# Patient Record
Sex: MALE | Race: WHITE | HISPANIC OR LATINO | Employment: OTHER | URBAN - METROPOLITAN AREA
[De-identification: names, ages, dates, MRNs, and addresses within clinical notes are randomized per-mention and may not be internally consistent; named-entity substitution may affect disease eponyms.]

---

## 2019-11-15 ENCOUNTER — OFFICE VISIT (OUTPATIENT)
Dept: FAMILY MEDICINE | Facility: CLINIC | Age: 44
End: 2019-11-15
Payer: COMMERCIAL

## 2019-11-15 VITALS
OXYGEN SATURATION: 97 % | DIASTOLIC BLOOD PRESSURE: 95 MMHG | HEIGHT: 70 IN | SYSTOLIC BLOOD PRESSURE: 129 MMHG | HEART RATE: 76 BPM | WEIGHT: 206 LBS | TEMPERATURE: 97.8 F | BODY MASS INDEX: 29.49 KG/M2 | RESPIRATION RATE: 16 BRPM

## 2019-11-15 DIAGNOSIS — R73.09 ELEVATED GLUCOSE: ICD-10-CM

## 2019-11-15 DIAGNOSIS — Z13.220 SCREENING FOR LIPOID DISORDERS: ICD-10-CM

## 2019-11-15 DIAGNOSIS — Z23 NEED FOR TD VACCINE: ICD-10-CM

## 2019-11-15 DIAGNOSIS — Z00.00 PREVENTATIVE HEALTH CARE: Primary | ICD-10-CM

## 2019-11-15 LAB
BUN SERPL-MCNC: 14 MG/DL (ref 5–24)
CALCIUM SERPL-MCNC: 9.1 MG/DL (ref 8.5–10.4)
CHLORIDE SERPLBLD-SCNC: 106 MMOL/L (ref 94–109)
CHOLEST SERPL-MCNC: 217 MG/DL (ref 0–200)
CHOLEST/HDLC SERPL: 5.2 {RATIO} (ref 0–5)
CO2 SERPL-SCNC: 28 MMOL/L (ref 20–32)
CREAT SERPL-MCNC: 0.9 MG/DL (ref 0.8–1.5)
EGFR CALCULATED (BLACK REFERENCE): 119
EGFR CALCULATED (NON BLACK REFERENCE): 98.4
FASTING SPECIMEN: YES
GLUCOSE SERPL-MCNC: 109 MG/DL (ref 60–99)
HDLC SERPL-MCNC: 42 MG/DL
LDLC SERPL CALC-MCNC: 132 MG/DL (ref 0–129)
POTASSIUM SERPL-SCNC: 4.2 MMOL/L (ref 3.4–5.3)
SODIUM SERPL-SCNC: 141 MMOL/L (ref 137.3–146.3)
TRIGL SERPL-MCNC: 216 MG/DL (ref 0–150)
VLDL-CHOLESTEROL: 43 (ref 7–32)

## 2019-11-15 SDOH — HEALTH STABILITY: MENTAL HEALTH: HOW OFTEN DO YOU HAVE A DRINK CONTAINING ALCOHOL?: 2-3 TIMES A WEEK

## 2019-11-15 ASSESSMENT — MIFFLIN-ST. JEOR: SCORE: 1837.53

## 2019-11-15 NOTE — NURSING NOTE
"44 year old  Chief Complaint   Patient presents with     Physical     no concerns       Blood pressure (!) 131/96, pulse 71, temperature 97.8  F (36.6  C), temperature source Oral, resp. rate 16, height 1.773 m (5' 9.8\"), weight 93.4 kg (206 lb), SpO2 97 %. Body mass index is 29.72 kg/m .  Patient Active Problem List   Diagnosis     Preventative health care     Hydradenitis     Acute pain of right shoulder       Wt Readings from Last 2 Encounters:   11/15/19 93.4 kg (206 lb)   08/04/16 85.3 kg (188 lb 1.9 oz)     BP Readings from Last 3 Encounters:   11/15/19 (!) 131/96   08/04/16 123/87   07/01/16 (!) 136/98         Current Outpatient Medications   Medication     ibuprofen (ADVIL,MOTRIN) 200 MG tablet     NO ACTIVE MEDICATIONS     No current facility-administered medications for this visit.        Social History     Tobacco Use     Smoking status: Never Smoker     Smokeless tobacco: Never Used   Substance Use Topics     Alcohol use: Yes     Frequency: 2-3 times a week     Drug use: No       Health Maintenance Due   Topic Date Due     HIV SCREENING  09/03/1992     PREVENTIVE CARE VISIT  12/06/2013     LIPID  12/06/2017     PHQ-2  01/01/2019     DTAP/TDAP/TD IMMUNIZATION (2 - Td) 04/30/2019     INFLUENZA VACCINE (1) 09/01/2019       No results found for: PAP      November 15, 2019 8:04 AM    "

## 2019-11-15 NOTE — NURSING NOTE
Prior to immunization administration, verified patients identity using patient s name and date of birth. Please see Immunization Activity for additional information.     Screening Questionnaire for Adult Immunization    Are you sick today?   No   Do you have allergies to medications, food, a vaccine component or latex?   No   Have you ever had a serious reaction after receiving a vaccination?   No   Do you have a long-term health problem with heart disease, lung disease, asthma, kidney disease, metabolic disease (e.g. diabetes), anemia, or other blood disorder?   No   Do you have cancer, leukemia, HIV/AIDS, or any other immune system problem?   No   In the past 3 months, have you taken medications that affect  your immune system, such as prednisone, other steroids, or anticancer drugs; drugs for the treatment of rheumatoid arthritis, Crohn s disease, or psoriasis; or have you had radiation treatments?   No   Have you had a seizure, or a brain or other nervous system problem?   No   During the past year, have you received a transfusion of blood or blood     products, or been given immune (gamma) globulin or antiviral drug?   No   For women: Are you pregnant or is there a chance you could become        pregnant during the next month?   No   Have you received any vaccinations in the past 4 weeks?   No     Immunization questionnaire answers were all negative.        Per orders of Dr. Huffman, injection of Td given by Yessy Eisenberg CMA. Patient instructed to remain in clinic for 15 minutes afterwards, and to report any adverse reaction to me immediately.       Screening performed by Yessy Eisenberg CMA on 11/15/2019 at 8:48 AM.

## 2019-11-15 NOTE — PROGRESS NOTES
"CHIEF COMPLAINT: Annual Physical      HISTORY OF PRESENT ILLNESS: Ap Johnson is a 44 year old male who presents for an annual physical.  Overall he is doing well. 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     Ap is very healthy and has no ongoing or active medical conditions. His blood pressure is slightly elevated in clinic today. He has no personal or family history of hypertension.     FAMILY, SOCIAL AND PAST SURGICAL HISTORIES:  Unchanged.       MEDICATIONS:  Carefully reviewed.       HEALTHCARE MAINTENANCE:    Health Maintenance   Topic Date Due     HIV SCREENING  09/03/1992     PREVENTIVE CARE VISIT  12/06/2013     LIPID  12/06/2017     PHQ-2  01/01/2019     DTAP/TDAP/TD IMMUNIZATION (2 - Td) 04/30/2019     INFLUENZA VACCINE (1) 09/01/2019     IPV IMMUNIZATION  Aged Out     MENINGITIS IMMUNIZATION  Aged Out     Eye exam: His eyes are fine. He is young enough that he does not need to go in on a regular basis to have them checked.   Hearing: He has been having a difficult time hearing when there is background noise. This is a change for him. He will also occasionally have a sensation as if someone is flicking his ear. No ear pain. Finger rub heard from 2 ft away.   Dental: Up to date  BP: 129/95  BMI: 29.72 kg/m , he has gained 18 pounds over 3 years. He does not exercise regularly.   Immunizations: Due for Td. He has received the flu vaccine.   Colonoscopy: No first degree relative with colon cancer. Will wait until age 50 for colon cancer screening.     REVIEW OF SYSTEMS:  A 10-point review is negative.       OBJECTIVE:    GENERAL: Ap Johnson is in no distress.  Affect is upbeat.     VITAL SIGNS: BP (!) 131/96 (BP Location: Left arm, Patient Position: Sitting, Cuff Size: Adult Large)   Pulse 71   Temp 97.8  F (36.6  C) (Oral)   Resp 16   Ht 1.773 m (5' 9.8\")   Wt 93.4 kg (206 lb)   SpO2 97%   BMI 29.72 kg/m    HEENT:  Head is normocephalic, " atraumatic. Ears clear bilaterally. No pain with palpation of the frontal or maxillary sinuses.  Eyes are grossly normal.  Nose is free of any congestion or discharge.  Teeth in good repair.  Mucous membranes are moist.  Throat looks benign.  Neck is supple without adenopathy or thyromegaly.  No carotid bruits are heard, no JVD.   BACK:  Smooth and straight.  No pain to percussion.  No CVA tenderness.   LUNGS:  Clear to auscultation bilaterally.   HEART:  Regular rate and rhythm without murmur.   ABDOMEN:  Benign.     EXTREMITIES:  Ankles free of any edema.   SKIN:  Warm and dry.       LABORATORY DATA: BMP and lipid panel, pending      ASSESSMENT AND PLAN:   1. Adult physical exam, up to date on healthcare maintenance strategies.   2. Td given today.  3. Routine screening. BMP and lipid panel, pending  4. Follow-up in 1 year for annual physical.    Call with any problems or questions.     IRandall, am serving as a scribe to document services personally performed by Dr. Harrison Huffman, based on data collection and the provider's statements to me. Dr. Huffman has reviewed, edited, and approved the above note.     --Harrison Huffman MD

## 2019-11-18 LAB — HBA1C MFR BLD: 5.3 % (ref 4.1–5.7)

## 2019-11-19 DIAGNOSIS — L73.2 HIDRADENITIS SUPPURATIVA: Primary | ICD-10-CM

## 2019-11-19 RX ORDER — DOXYCYCLINE 100 MG/1
100 CAPSULE ORAL 2 TIMES DAILY
Qty: 28 CAPSULE | Refills: 1 | Status: SHIPPED | OUTPATIENT
Start: 2019-11-19 | End: 2021-03-29

## 2020-12-14 ENCOUNTER — HEALTH MAINTENANCE LETTER (OUTPATIENT)
Age: 45
End: 2020-12-14

## 2021-01-15 ENCOUNTER — HEALTH MAINTENANCE LETTER (OUTPATIENT)
Age: 46
End: 2021-01-15

## 2021-03-29 ENCOUNTER — OFFICE VISIT (OUTPATIENT)
Dept: FAMILY MEDICINE | Facility: CLINIC | Age: 46
End: 2021-03-29
Payer: COMMERCIAL

## 2021-03-29 VITALS
HEIGHT: 67 IN | DIASTOLIC BLOOD PRESSURE: 96 MMHG | TEMPERATURE: 97.6 F | WEIGHT: 195.75 LBS | SYSTOLIC BLOOD PRESSURE: 134 MMHG | RESPIRATION RATE: 14 BRPM | BODY MASS INDEX: 30.72 KG/M2 | HEART RATE: 66 BPM | OXYGEN SATURATION: 97 %

## 2021-03-29 DIAGNOSIS — H90.3 SENSORINEURAL HEARING LOSS, BILATERAL: ICD-10-CM

## 2021-03-29 DIAGNOSIS — H93.11 TINNITUS, RIGHT: ICD-10-CM

## 2021-03-29 DIAGNOSIS — Z00.00 WELLNESS EXAMINATION: Primary | ICD-10-CM

## 2021-03-29 DIAGNOSIS — Z83.3 FAMILY HISTORY OF DIABETES MELLITUS: ICD-10-CM

## 2021-03-29 DIAGNOSIS — Z13.220 SCREENING FOR LIPID DISORDERS: ICD-10-CM

## 2021-03-29 DIAGNOSIS — R73.09 ELEVATED GLUCOSE LEVEL: ICD-10-CM

## 2021-03-29 DIAGNOSIS — G89.29 CHRONIC PAIN OF RIGHT KNEE: ICD-10-CM

## 2021-03-29 DIAGNOSIS — M25.561 CHRONIC PAIN OF RIGHT KNEE: ICD-10-CM

## 2021-03-29 LAB
BUN SERPL-MCNC: 14 MG/DL (ref 5–24)
CALCIUM SERPL-MCNC: 9.3 MG/DL (ref 8.5–10.4)
CHLORIDE SERPLBLD-SCNC: 106 MMOL/L (ref 94–109)
CHOLEST SERPL-MCNC: 202 MG/DL (ref 0–200)
CHOLEST/HDLC SERPL: 4.5 {RATIO} (ref 0–5)
CO2 SERPL-SCNC: 29 MMOL/L (ref 20–32)
CREAT SERPL-MCNC: 0.9 MG/DL (ref 0.8–1.5)
EGFR CALCULATED (BLACK REFERENCE): 118.4
EGFR CALCULATED (NON BLACK REFERENCE): 97.9
FASTING SPECIMEN: YES
GLUCOSE SERPL-MCNC: 111 MG/DL (ref 60–99)
HBA1C MFR BLD: 5.5 % (ref 4.1–5.7)
HDLC SERPL-MCNC: 45 MG/DL
LDLC SERPL CALC-MCNC: 123 MG/DL (ref 0–129)
POTASSIUM SERPL-SCNC: 4.5 MMOL/L (ref 3.4–5.3)
SODIUM SERPL-SCNC: 142 MMOL/L (ref 137.3–146.3)
TRIGL SERPL-MCNC: 170 MG/DL (ref 0–150)
VLDL-CHOLESTEROL: 34 (ref 7–32)

## 2021-03-29 SDOH — HEALTH STABILITY: MENTAL HEALTH: HOW OFTEN DO YOU HAVE A DRINK CONTAINING ALCOHOL?: 2-4 TIMES A MONTH

## 2021-03-29 SDOH — HEALTH STABILITY: MENTAL HEALTH: HOW OFTEN DO YOU HAVE 6 OR MORE DRINKS ON ONE OCCASION?: NEVER

## 2021-03-29 SDOH — HEALTH STABILITY: MENTAL HEALTH: HOW MANY STANDARD DRINKS CONTAINING ALCOHOL DO YOU HAVE ON A TYPICAL DAY?: 1 OR 2

## 2021-03-29 ASSESSMENT — MIFFLIN-ST. JEOR: SCORE: 1747.29

## 2021-03-29 NOTE — NURSING NOTE
"45 year old  Chief Complaint   Patient presents with     Physical       Blood pressure (!) 135/93, pulse 73, temperature 97.6  F (36.4  C), temperature source Skin, resp. rate 14, height 1.711 m (5' 7.36\"), weight 88.8 kg (195 lb 12 oz), SpO2 97 %. Body mass index is 30.33 kg/m .  Patient Active Problem List   Diagnosis     Preventative health care     Hydradenitis     Acute pain of right shoulder       Wt Readings from Last 2 Encounters:   03/29/21 88.8 kg (195 lb 12 oz)   11/15/19 93.4 kg (206 lb)     BP Readings from Last 3 Encounters:   03/29/21 (!) 135/93   11/15/19 (!) 129/95   08/04/16 123/87         Current Outpatient Medications   Medication     doxycycline hyclate (VIBRAMYCIN) 100 MG capsule     ibuprofen (ADVIL,MOTRIN) 200 MG tablet     NO ACTIVE MEDICATIONS     No current facility-administered medications for this visit.        Social History     Tobacco Use     Smoking status: Never Smoker     Smokeless tobacco: Never Used   Substance Use Topics     Alcohol use: Yes     Frequency: 2-4 times a month     Drinks per session: 1 or 2     Binge frequency: Never     Drug use: No       Health Maintenance Due   Topic Date Due     ADVANCE CARE PLANNING  Never done     HIV SCREENING  Never done     HEPATITIS C SCREENING  Never done     PREVENTIVE CARE VISIT  11/15/2020       No results found for: PAP      March 29, 2021 8:02 AM    "

## 2021-03-29 NOTE — PATIENT INSTRUCTIONS
"1. Please buy a home BP monitor: e.g., Omron.     2. If your BP is >140/90, then try the \"DASH\" diet.    3. If that doesn't work, then we'll go to a low dose of a medication.  "

## 2021-03-30 NOTE — PROGRESS NOTES
"CHIEF COMPLAINT:  Annual wellness visit.      HISTORY OF PRESENT ILLNESS:  Ap is a 45-year-old here for the above.  Overall, he is doing very well.  He does not have any significant concerns.      There are 2 things that he wanted to discuss briefly.  One is that he continues to have occasional tinnitus in his ears.  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  He would be open to getting an audiogram.  In addition, the tinnitus or flicking sensation comes and goes.  It is typically present for a while and then disappears.  He had it recently while he was skiing in [place] and now it is not there.  Of note, he had a PE tube in his right ear as a child.      The other concern is that he has had some chronic right knee pain for a long time.  He skied a lot when he was younger.  Interestingly, he has no pain while he is skiing.  However, going up stairs and occasionally when going down stairs he will feel the pain.  Generally, it is not too severe but every once in a while he will get a \"jolt\" of pain.  He does not want to do anything about it right now as it is not bothering him that much.  However, at some point, he would be interested in meeting with one of our sports medicine doctors.      SOCIAL, FAMILY AND PAST SURGICAL HISTORIES:  Reviewed and unchanged.      HEALTHCARE MAINTENANCE:  No vision changes.  Hearing as above.  No change in his teeth or dental care.  Blood pressure has been creeping up.  He is working out more and has adjusted his diet.  He has no family history of early heart disease or hypertension.  He has intentionally lost weight.  Weight was 206 pounds in 11/2019 and now is down to 195 pounds 12 ounces.  From a medication standpoint, he is on nothing at this time.  He will have both a lipid panel as well as a metabolic panel obtained today.  In addition, we will check an A1c due to a family history of diabetes " and the fact that his last glucose was about 109 when last checked about a year ago.      He is open to purchasing a home blood pressure cuff.  He will check some readings.  In addition, he has been doing something like the DASH diet.  He would like to hold off on medication, and at his relatively young age and the absence of a family history of heart disease, that is certainly a reasonable thing to do.      No colon cancer screening at this point.  We will wait 2 more years on that.  No need for prostate cancer screening, as there is no early history in the family.      OBJECTIVE:  Ap is in no distress.  Affect is upbeat.  Breathing comfortably.  /93 and then it was 134/96.  Pulse is 73 and regular.  Temperature is 97.6.  He is 5 feet 7.4 inches in height and weighs 195 pounds and 12 ounces with a BMI of 30.33.  O2 sats are 97% on room air.   HEENT:  Head is normocephalic, atraumatic.  Eyes are grossly normal.  Ear shows a small scar on the posterior inferior aspect of his right tympanic membrane.  Left ear looks fine.  No fluid on either side.  No evidence of an otitis media or any kind of effusion.   NECK:  Supple without any anterior or posterior chain adenopathy.  No visible or palpable thyromegaly.  No carotid bruits are heard.   LUNGS:  Clear to auscultation bilaterally.   HEART:  Regular rate and rhythm without murmur.   EXTREMITIES:  Ankles are free of any edema.  Right knee was not examined by me today.      LABORATORY DATA:  Pending.      ASSESSMENT AND PLAN:   1.  Annual wellness visit, up to date with healthcare maintenance strategies.  Not discussed above, but he is up to date with immunizations and had both COVID vaccines at his place of employment.   2.  Screening for hyperlipidemia with a lipid panel.   3.  Bilateral sensorineural hearing loss, audiogram pending.  Some right-sided tinnitus.  If the hearing loss is asymmetric, we will consider doing an MRI of the acoustic nerves.   4.   "Family history of diabetes mellitus and past history of elevated glucose.  A1c level pending.   5.  Possibly some version of eustachian tube dysfunction given the \"flicking\" sound he hears in his right ear at times.  We will get the other testing done first.   6.  Right knee pain.  See Dr. Richard Tejada here at the clinic.   7.  Elevated blood pressure without a diagnosis of hypertension at this point.  Purchase an Omron blood pressure cuff.  Check first thing in the morning after he has been sitting for 5 minutes and resting.  If his blood pressure is greater than 140 and/or 90, we will have him do a DASH diet for 3-6 months.  If that does not bring it down, then we will consider trying something like lisinopril 5 mg once daily.  Ap completely agrees with the above plan.         "

## 2021-10-02 ENCOUNTER — HEALTH MAINTENANCE LETTER (OUTPATIENT)
Age: 46
End: 2021-10-02

## 2021-12-01 NOTE — TELEPHONE ENCOUNTER
FUTURE VISIT INFORMATION      FUTURE VISIT INFORMATION:    Date: 1/13/2022    Time: 10:10AM    Location: [facility]  REFERRAL INFORMATION:    Referring provider:  Harrison Huffman MD    Referring providers clinic:  [facility]    Reason for visit/diagnosis  Sensorineural hearing loss, bilateral,Tinnitus, right per Pt, Ref by Harrison Huffman MD in [facility], Ref in [facility].    RECORDS REQUESTED FROM:       Clinic name Comments Records Status Imaging Status   [facility] 3/29/2021 note from Harrison Huffman MD Epic

## 2022-01-13 ENCOUNTER — PRE VISIT (OUTPATIENT)
Dept: OTOLARYNGOLOGY | Facility: CLINIC | Age: 47
End: 2022-01-13

## 2022-01-13 ENCOUNTER — OFFICE VISIT (OUTPATIENT)
Dept: AUDIOLOGY | Facility: CLINIC | Age: 47
End: 2022-01-13
Payer: COMMERCIAL

## 2022-01-13 ENCOUNTER — OFFICE VISIT (OUTPATIENT)
Dept: OTOLARYNGOLOGY | Facility: CLINIC | Age: 47
End: 2022-01-13
Attending: FAMILY MEDICINE
Payer: COMMERCIAL

## 2022-01-13 VITALS — WEIGHT: 195 LBS | BODY MASS INDEX: 27.92 KG/M2 | HEIGHT: 70 IN

## 2022-01-13 DIAGNOSIS — H93.11 TINNITUS, RIGHT: ICD-10-CM

## 2022-01-13 DIAGNOSIS — H91.90 PERCEIVED HEARING LOSS: Primary | ICD-10-CM

## 2022-01-13 DIAGNOSIS — H93.13 TINNITUS OF BOTH EARS: Primary | ICD-10-CM

## 2022-01-13 PROCEDURE — 92557 COMPREHENSIVE HEARING TEST: CPT | Performed by: AUDIOLOGIST

## 2022-01-13 PROCEDURE — 92550 TYMPANOMETRY & REFLEX THRESH: CPT | Performed by: AUDIOLOGIST

## 2022-01-13 PROCEDURE — 99203 OFFICE O/P NEW LOW 30 MIN: CPT | Performed by: REGISTERED NURSE

## 2022-01-13 ASSESSMENT — PAIN SCALES - GENERAL: PAINLEVEL: NO PAIN (0)

## 2022-01-13 ASSESSMENT — MIFFLIN-ST. JEOR: SCORE: 1780.76

## 2022-01-13 NOTE — LETTER
"1/13/2022       RE: Ap Johnson  [address] [place] [address]     Dear Colleague,    Thank you for referring your patient, Ap Johnson, to the [facility] [place] at [facility]. Please see a copy of my visit note below.      Otolaryngology Clinic  January 13, 2022    Chief Complaint:   Hearing loss and tinnitus      History of Present Illness:   Ap Johnson is a 46 year old male who presents today for evaluation of perceived hearing loss and worsening bilateral tinnitus. Patient reports that, within the last year, he has noticed more difficulty hearing in noisy environments or if people are speaking to him in another room. Also reports an increase of tinnitus symptoms over the past 6-12 months. Reports a bilateral, constant, high-pitched tinnitus. This used to come and go but is now constant. Patient has to work to ignore noise throughout the day. Is bothersome at night, does not use masking strategies.     Patient denies any otalgia, otorrhea, dizziness, facial numbness/weakness, or ear surgeries. 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 Patient also had frequent ear infections as a child requiring PE tubes. No diagnosis of TMJ but has been told by dentist that he grinds his teeth.    Past Medical History:  No past medical history on file.    Past Surgical History:  No past surgical history on file.    Medications:  No current outpatient medications on file.     Allergies:  No Known Allergies     Social History:  Social History     Tobacco Use     Smoking status: Never Smoker     Smokeless tobacco: Never Used   Substance Use Topics     Alcohol use: Yes     Drug use: No     ROS: 10 point ROS neg other than the symptoms noted above in the HPI.    Physical Exam:    Ht 1.778 m (5' 10\")   Wt 88.5 kg (195 lb)   BMI 27.98 kg/m   "     Constitutional:  The patient was unaccompanied, well-groomed, and in no acute distress.     Neurologic: Alert and oriented x 3.  CN's III-XII within normal limits.  Voice normal.   Psychiatric: The patient's affect was calm, cooperative, and appropriate.    Communication:  Normal; communicates verbally, normal voice quality.   Respiratory: Breathing comfortably without stridor or exertion of accessory muscles.    Ears: Pinnae and tragus non-tender.  EAC's and TM's were clear.    Oral Cavity: No significant tenderness with palpation of temporomandibular joints.      Audiogram: 1/13/2022- data independently reviewed  Normal hearing bilaterally   WR right: 96% left: 92% @ 50 dB  Acoustic Reflexes: left ipsi and right contra present, absent in other conditions  Tympanograms: type A bilaterally        Assessment and Plan:  1. Tinnitus  Patient with bilateral tinnitus.  Reviewed audiogram with patient today which does not show any significant hearing loss.  Reviewed other factors that can contribute to tinnitus including stress, anxiety, lack of sleep, headaches and TMJ.  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  Reviewed the relationship between stress and worsening of tinnitus.  Also discussed management strategies of tinnitus including tinnitus masking and mindfulness/cognitive behavioral approaches.  Recommend the patient start using tinnitus masking especially at night or when tinnitus is most bothersome.  Discussed health psychology referral for cognitive behavioral approaches, however, patient would like to hold off on referral for now.  Recommend patient continue to monitor and return to clinic if symptoms worsen or do not improve with management strategies discussed.    2. Perceived hearing loss  Patient with perceived hearing loss.  Again, reviewed audiogram which does not show any significant hearing loss.  Discussed with patient that  communication is more than just the physical ability to hear.  It may be that the brain is more distracted at this time with different stressors making hearing comprehension more difficult.  Encouraged patient to continue to monitor and contact clinic if he continues to feel that his hearing is worsening.  May benefit from aural rehabilitation.    Patient will follow up as needed if symptoms worsen or fail to improve.    Felicia Sharif DNP, APRN, CNP  Otolaryngology  Head & Neck Surgery  [phone]    40 minutes spent on the date of the encounter doing chart review, history and exam, documentation and further activities per the note

## 2022-01-13 NOTE — PROGRESS NOTES
AUDIOLOGY REPORT     SUMMARY: Audiology visit completed. See audiogram for results.       RECOMMENDATIONS: Follow-up with ENT.     Chastity Alfred M.A.   Audiology Doctoral Student           I was present with the patient for the entire Audiology appointment including all procedures/testing performed by the AuD student, and agree with the student s assessment and plan as documented.     Carlota Milian.  Licensed Audiologist  [place] #[id number]

## 2022-01-13 NOTE — NURSING NOTE
"Chief Complaint   Patient presents with     Consult     Sensorineural hearing loss, bilateral, tinnitus, right       Height 1.778 m (5' 10\"), weight 88.5 kg (195 lb).    Heather Yanez, EMT    "

## 2022-01-13 NOTE — PROGRESS NOTES
"  Otolaryngology Clinic  January 13, 2022    Chief Complaint:   Hearing loss and tinnitus      History of Present Illness:   Ap Johnson is a 46 year old male who presents today for evaluation of perceived hearing loss and worsening bilateral tinnitus. Patient reports that, within the last year, he has noticed more difficulty hearing in noisy environments or if people are speaking to him in another room. Also reports an increase of tinnitus symptoms over the past 6-12 months. Reports a bilateral, constant, high-pitched tinnitus. This used to come and go but is now constant. Patient has to work to ignore noise throughout the day. Is bothersome at night, does not use masking strategies.     Patient denies any otalgia, otorrhea, dizziness, facial numbness/weakness, or ear surgeries. 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 Patient also had frequent ear infections as a child requiring PE tubes. No diagnosis of TMJ but has been told by dentist that he grinds his teeth.    Past Medical History:  No past medical history on file.    Past Surgical History:  No past surgical history on file.    Medications:  No current outpatient medications on file.     Allergies:  No Known Allergies     Social History:  Social History     Tobacco Use     Smoking status: Never Smoker     Smokeless tobacco: Never Used   Substance Use Topics     Alcohol use: Yes     Drug use: No     ROS: 10 point ROS neg other than the symptoms noted above in the HPI.    Physical Exam:    Ht 1.778 m (5' 10\")   Wt 88.5 kg (195 lb)   BMI 27.98 kg/m       Constitutional:  The patient was unaccompanied, well-groomed, and in no acute distress.     Neurologic: Alert and oriented x 3.  CN's III-XII within normal limits.  Voice normal.   Psychiatric: The patient's affect was calm, cooperative, and appropriate.    Communication:  Normal; communicates verbally, normal voice quality.   Respiratory: " Breathing comfortably without stridor or exertion of accessory muscles.    Ears: Pinnae and tragus non-tender.  EAC's and TM's were clear.    Oral Cavity: No significant tenderness with palpation of temporomandibular joints.      Audiogram: 1/13/2022- data independently reviewed  Normal hearing bilaterally   WR right: 96% left: 92% @ 50 dB  Acoustic Reflexes: left ipsi and right contra present, absent in other conditions  Tympanograms: type A bilaterally        Assessment and Plan:  1. Tinnitus  Patient with bilateral tinnitus.  Reviewed audiogram with patient today which does not show any significant hearing loss.  Reviewed other factors that can contribute to tinnitus including stress, anxiety, lack of sleep, headaches and TMJ.  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  Reviewed the relationship between stress and worsening of tinnitus.  Also discussed management strategies of tinnitus including tinnitus masking and mindfulness/cognitive behavioral approaches.  Recommend the patient start using tinnitus masking especially at night or when tinnitus is most bothersome.  Discussed health psychology referral for cognitive behavioral approaches, however, patient would like to hold off on referral for now.  Recommend patient continue to monitor and return to clinic if symptoms worsen or do not improve with management strategies discussed.    2. Perceived hearing loss  Patient with perceived hearing loss.  Again, reviewed audiogram which does not show any significant hearing loss.  Discussed with patient that communication is more than just the physical ability to hear.  It may be that the brain is more distracted at this time with different stressors making hearing comprehension more difficult.  Encouraged patient to continue to monitor and contact clinic if he continues to feel that his hearing is worsening.  May benefit from aural  rehabilitation.    Patient will follow up as needed if symptoms worsen or fail to improve.    Felicia Sharif DNP, APRN, CNP  Otolaryngology  Head & Neck Surgery  [phone]    40 minutes spent on the date of the encounter doing chart review, history and exam, documentation and further activities per the note

## 2022-05-14 ENCOUNTER — HEALTH MAINTENANCE LETTER (OUTPATIENT)
Age: 47
End: 2022-05-14
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
## 2022-05-27 ENCOUNTER — OFFICE VISIT (OUTPATIENT)
Dept: FAMILY MEDICINE | Facility: CLINIC | Age: 47
End: 2022-05-27
Payer: COMMERCIAL

## 2022-05-27 VITALS
WEIGHT: 204 LBS | SYSTOLIC BLOOD PRESSURE: 135 MMHG | RESPIRATION RATE: 15 BRPM | OXYGEN SATURATION: 98 % | BODY MASS INDEX: 29.27 KG/M2 | HEART RATE: 76 BPM | TEMPERATURE: 97.6 F | DIASTOLIC BLOOD PRESSURE: 93 MMHG

## 2022-05-27 DIAGNOSIS — M25.511 CHRONIC RIGHT SHOULDER PAIN: Primary | ICD-10-CM

## 2022-05-27 DIAGNOSIS — G89.29 CHRONIC RIGHT SHOULDER PAIN: Primary | ICD-10-CM

## 2022-05-27 NOTE — NURSING NOTE
46 year old  Chief Complaint   Patient presents with     Shoulder     Right shoulder pain x few months, recurring pain       Blood pressure (!) 135/93, pulse 76, temperature 97.6  F (36.4  C), temperature source Skin, resp. rate 15, weight 92.5 kg (204 lb), SpO2 98 %. Body mass index is 29.27 kg/m .  Patient Active Problem List   Diagnosis     Wellness examination     Hydradenitis     Acute pain of right shoulder     Chronic pain of right knee     Family history of diabetes mellitus       Wt Readings from Last 2 Encounters:   05/27/22 92.5 kg (204 lb)   01/13/22 88.5 kg (195 lb)     BP Readings from Last 3 Encounters:   05/27/22 (!) 135/93   03/29/21 (!) 134/96   11/15/19 (!) 129/95         No current outpatient medications on file.     No current facility-administered medications for this visit.       Social History     Tobacco Use     Smoking status: Never Smoker     Smokeless tobacco: Never Used   Substance Use Topics     Alcohol use: Yes     Drug use: No       Health Maintenance Due   Topic Date Due     ADVANCE CARE PLANNING  Never done     HIV SCREENING  Never done     HEPATITIS C SCREENING  Never done     PREVENTIVE CARE VISIT  03/29/2022       No results found for: PAP      May 27, 2022 9:08 AM

## 2022-05-30 PROBLEM — G89.29 CHRONIC RIGHT SHOULDER PAIN: Status: ACTIVE | Noted: 2022-05-30

## 2022-05-30 PROBLEM — M25.511 CHRONIC RIGHT SHOULDER PAIN: Status: ACTIVE | Noted: 2022-05-30

## 2022-06-09 ENCOUNTER — THERAPY VISIT (OUTPATIENT)
Dept: PHYSICAL THERAPY | Facility: CLINIC | Age: 47
End: 2022-06-09
Attending: FAMILY MEDICINE
Payer: COMMERCIAL

## 2022-06-09 DIAGNOSIS — M25.511 CHRONIC RIGHT SHOULDER PAIN: ICD-10-CM

## 2022-06-09 DIAGNOSIS — G89.29 CHRONIC RIGHT SHOULDER PAIN: ICD-10-CM

## 2022-06-09 PROCEDURE — 97110 THERAPEUTIC EXERCISES: CPT | Mod: GP | Performed by: PHYSICAL THERAPIST

## 2022-06-09 PROCEDURE — 97161 PT EVAL LOW COMPLEX 20 MIN: CPT | Mod: GP | Performed by: PHYSICAL THERAPIST

## 2022-06-09 NOTE — PROGRESS NOTES
Physical Therapy Initial Evaluation: Subjective History      Therapist Assessment: Ap Johnson is a 46 year old male patient presenting to Physical Therapy with R shoulder pain. Patient demonstrates + HK on R, + Neers on R, + full can on R, decreased R shoulder flexion and abduction AROM due to pain, painful activation of R shoulder ABD and ER. Signs and symptoms are consistent with R RTC tendinopathy. These impairments limit their ability to reach without pain. Skilled PT services are necessary in order to reduce impairments and improve independent function.     Injury/Condition Details:  Presenting Complaint R shoulder    Onset Timing/Date 3-4 months ago (Doctor's referral 5/27/2022)    Mechanism Injured shoulder while exercising      Symptom Behavior Details    Primary Symptoms Sporadic symptoms; Activity/position dependent, pain (Location: superior, lateral and anterior shoulder on R, Quality: Sharp and Tender), weakness      Denies locking, catching, giving way, or instability. Denies numbness, tingling, changes in sensation. Denies having related symptoms spreading to the R forearm.    Worst Pain 5/10 (with sleeping)   Symptom Provocators Sleeping, reaching overhead   Best Pain 0/10    Symptom Relievers Ibuprofen, rest   Time of day dependent? No   Recent symptom change? no change in symptoms     Prior Testing/Intervention for current condition:  Prior Tests  None    Prior Treatment PT      Lifestyle & General Medical History:  Employment Nurse - [employer]    Usual physical activities  (within past year) Weight training   Orthopaedic History  R shoulder pain    Medication  None reported by patient   Notable medical history None reported by patient    Patient goals Full range of motion without weakness, and be able to weight train without injuring it more    Patient Reported Health good       SHOULDER EXAMINATION    STATIC POSTURE  Forward head on neck and Rounded shoulders     Cervical  Screen:   ROM: full and pain free  Spurlings: negative  Compression: negative  Distraction: negative     Scapular Kinematic Evaluation:   Position/Test Findings   Hands resting at sides Anterior tilted scapula R and Protracted scapula R   Hands on hips No obvious findings   90deg scaption Anterior tilted scapula R and Protracted scapula R   Repeated elevation       Plane of ABD       Plane of Flexion   Medial boarder prominence R (type II)  Medial boarder prominence R (type II)   Resisted scaption No obvious findings   Flip sign/resisted ER No obvious findings     SHOULDER RANGE OF MOTION & STRENGTH  (*Indicates patient s pain)   PROM L PROM R AROM L AROM R MMT L MMT R   Flex   180 180 180 170* 5/5 4+/5    180 180 170* 5/5 4/5*   ER (90) 90 90 80 60 4/5 4-/5*   IR (70)     5/5 5/5   Ext/IR  (functional)   T4 L5       JOINT MOBILITY  Hypomobile R posterior and inferior GH    SPECIAL TESTS   (+) L: NA   (-) L: Klever Gastelum, Painful arc, Full can, [place]'s (active compression) and Speed's  (+) R: Klever Gastelum and Full can   (-) R: Painful arc, [place]'s (active compression) and Speed's        ASSESSMENT/PLAN:  The patient is a 46 year old male with chief complaint of R shoulder pain.    The patient has the following significant findings with corresponding treatment plan.  Diagnosis 1:  signs and symptoms consistent R RTC tendinopathy     Pain -  manual therapy, splint/taping/bracing/orthotics, self management, education and home program  Decreased ROM/flexibility - manual therapy, therapeutic exercise and home program  Decreased joint mobility - manual therapy, therapeutic exercise and home program  Decreased strength - therapeutic exercise, therapeutic activities and home program  Impaired balance - neuro re-education, therapeutic activities and home program  Decreased proprioception - neuro re-education and therapeutic activities  Impaired gait - gait training and assistive devices  Impaired  muscle performance - neuro re-education and home program  Decreased function - therapeutic activities and home program  Impaired posture - neuro re-education, therapeutic activities and home program  Instability -  Therapeutic Activity, Therapeutic Exercise, Neuromuscular Re-education, Splinting/Taping/Bracing/Orthotic, home program    Therapy Evaluation Codes:   1) History comprised of:   Personal factors that impact the plan of care:      None.    Comorbidity factors that impact the plan of care are:      None.     Medications impacting care: None.  2) Examination of Body Systems comprised of:   Body structures and functions that impact the plan of care:      Shoulder.   Activity limitations that impact the plan of care are:      Cooking, Dressing and Lifting.  3) Clinical presentation characteristics are:   Stable/Uncomplicated.  4) Decision-Making    Low complexity using standardized patient assessment instrument and/or measureable assessment of functional outcome.  Cumulative Therapy Evaluation is: Low complexity.    Previous and current functional limitations:  (See Goal Flow Sheet for this information)    Short term and Long term goals: (See Goal Flow Sheet for this information)     Communication ability:  Patient appears to be able to clearly communicate and understand verbal and written communication and follow directions correctly.  Treatment Explanation - The following has been discussed with the patient:   RX ordered/plan of care  Anticipated outcomes  Possible risks and side effects  This patient would benefit from PT intervention to resume normal activities.   Rehab potential is good.    Frequency:  1 X week, once daily  Duration:  for 6 visits  Discharge Plan: Achieve all LTGs, be independent in home treatment program, and reach maximal therapeutic benefit.    Please refer to the daily flowsheet for treatment today, total treatment time and time spent performing 1:1 timed codes.

## 2022-06-16 ENCOUNTER — THERAPY VISIT (OUTPATIENT)
Dept: PHYSICAL THERAPY | Facility: CLINIC | Age: 47
End: 2022-06-16
Payer: COMMERCIAL

## 2022-06-16 DIAGNOSIS — M25.511 CHRONIC RIGHT SHOULDER PAIN: Primary | ICD-10-CM

## 2022-06-16 DIAGNOSIS — G89.29 CHRONIC RIGHT SHOULDER PAIN: Primary | ICD-10-CM

## 2022-06-16 PROCEDURE — 97110 THERAPEUTIC EXERCISES: CPT | Mod: GP | Performed by: PHYSICAL THERAPIST

## 2022-06-16 PROCEDURE — 97140 MANUAL THERAPY 1/> REGIONS: CPT | Mod: GP | Performed by: PHYSICAL THERAPIST

## 2022-06-23 ENCOUNTER — THERAPY VISIT (OUTPATIENT)
Dept: PHYSICAL THERAPY | Facility: CLINIC | Age: 47
End: 2022-06-23
Payer: COMMERCIAL

## 2022-06-23 DIAGNOSIS — G89.29 CHRONIC RIGHT SHOULDER PAIN: Primary | ICD-10-CM

## 2022-06-23 DIAGNOSIS — M25.511 CHRONIC RIGHT SHOULDER PAIN: Primary | ICD-10-CM

## 2022-06-23 PROCEDURE — 97110 THERAPEUTIC EXERCISES: CPT | Mod: GP | Performed by: PHYSICAL THERAPIST

## 2022-06-23 PROCEDURE — 97140 MANUAL THERAPY 1/> REGIONS: CPT | Mod: GP | Performed by: PHYSICAL THERAPIST

## 2022-07-14 ENCOUNTER — THERAPY VISIT (OUTPATIENT)
Dept: PHYSICAL THERAPY | Facility: CLINIC | Age: 47
End: 2022-07-14
Payer: COMMERCIAL

## 2022-07-14 DIAGNOSIS — M25.511 CHRONIC RIGHT SHOULDER PAIN: Primary | ICD-10-CM

## 2022-07-14 DIAGNOSIS — G89.29 CHRONIC RIGHT SHOULDER PAIN: Primary | ICD-10-CM

## 2022-07-14 PROCEDURE — 97110 THERAPEUTIC EXERCISES: CPT | Mod: GP | Performed by: PHYSICAL THERAPIST

## 2022-07-14 PROCEDURE — 97112 NEUROMUSCULAR REEDUCATION: CPT | Mod: GP | Performed by: PHYSICAL THERAPIST

## 2022-07-28 ENCOUNTER — THERAPY VISIT (OUTPATIENT)
Dept: PHYSICAL THERAPY | Facility: CLINIC | Age: 47
End: 2022-07-28
Payer: COMMERCIAL

## 2022-07-28 DIAGNOSIS — G89.29 CHRONIC RIGHT SHOULDER PAIN: Primary | ICD-10-CM

## 2022-07-28 DIAGNOSIS — M25.511 CHRONIC RIGHT SHOULDER PAIN: Primary | ICD-10-CM

## 2022-07-28 PROCEDURE — 97110 THERAPEUTIC EXERCISES: CPT | Mod: GP | Performed by: PHYSICAL THERAPIST

## 2022-07-28 PROCEDURE — 97140 MANUAL THERAPY 1/> REGIONS: CPT | Mod: GP | Performed by: PHYSICAL THERAPIST

## 2022-08-31 PROBLEM — G89.29 CHRONIC RIGHT SHOULDER PAIN: Status: RESOLVED | Noted: 2022-05-30 | Resolved: 2022-08-31

## 2022-08-31 PROBLEM — M25.511 CHRONIC RIGHT SHOULDER PAIN: Status: RESOLVED | Noted: 2022-05-30 | Resolved: 2022-08-31

## 2022-08-31 NOTE — PROGRESS NOTES
Discharge Note    Progress reporting period is from initial evaluation date (please see noted date below) to Jul 28, 2022.  Linked Episodes   Type: Episode: Status: Noted: Resolved: Last update: Updated by:   PHYSICAL THERAPY Right shoulder 6/9/2022 Active 6/9/2022 7/28/2022 10:08 AM Paulina Jose, PT      Comments:       Ap failed to follow up and current status is unknown.  Please see information below for last relevant information on current status.  Patient seen for 5 visits.    SUBJECTIVE  Subjective changes noted by patient:  Patient states that he hasn't had any moments of sharp pain in shoulder, he feels a tightness at end range flexion  .  Current pain level is 0/10.     Previous pain level was   .   Changes in function:  Yes (See Goal flowsheet attached for changes in current functional level)  Adverse reaction to treatment or activity: None    OBJECTIVE  Changes noted in objective findings: R shoulder AROM: flexion = 170, ABD = 175     ASSESSMENT/PLAN  Diagnosis: R RTC tendinopathy   Updated problem list and treatment plan:   Pain - HEP  Decreased ROM/flexibility - HEP  Decreased function - HEP  Decreased strength - HEP  STG/LTGs have been met or progress has been made towards goals:  Yes, please see goal flowsheet for most current information  Assessment of Progress: current status is unknown.    Last current status:     Self Management Plans:  HEP  I have re-evaluated this patient and find that the nature, scope, duration and intensity of the therapy is appropriate for the medical condition of the patient.  Ap continues to require the following intervention to meet STG and LTG's:  HEP.    Recommendations:  Discharge with current home program.  Patient to follow up with MD as needed.    Please refer to the daily flowsheet for treatment today, total treatment time and time spent performing 1:1 timed codes.

## 2022-09-03 ENCOUNTER — HEALTH MAINTENANCE LETTER (OUTPATIENT)
Age: 47
End: 2022-09-03

## 2022-09-16 ENCOUNTER — OFFICE VISIT (OUTPATIENT)
Dept: FAMILY MEDICINE | Facility: CLINIC | Age: 47
End: 2022-09-16
Payer: COMMERCIAL

## 2022-09-16 VITALS
BODY MASS INDEX: 28.63 KG/M2 | HEIGHT: 70 IN | OXYGEN SATURATION: 97 % | RESPIRATION RATE: 15 BRPM | DIASTOLIC BLOOD PRESSURE: 80 MMHG | HEART RATE: 72 BPM | TEMPERATURE: 97.8 F | SYSTOLIC BLOOD PRESSURE: 118 MMHG | WEIGHT: 200 LBS

## 2022-09-16 DIAGNOSIS — Z11.59 NEED FOR HEPATITIS C SCREENING TEST: ICD-10-CM

## 2022-09-16 DIAGNOSIS — Z13.220 SCREENING FOR LIPID DISORDERS: ICD-10-CM

## 2022-09-16 DIAGNOSIS — R06.83 SNORING: ICD-10-CM

## 2022-09-16 DIAGNOSIS — Z00.00 WELLNESS EXAMINATION: Primary | ICD-10-CM

## 2022-09-16 DIAGNOSIS — R73.09 ELEVATED GLUCOSE LEVEL: ICD-10-CM

## 2022-09-16 LAB
ANION GAP SERPL CALCULATED.3IONS-SCNC: 11 MMOL/L (ref 7–15)
BUN SERPL-MCNC: 15.3 MG/DL (ref 6–20)
CALCIUM SERPL-MCNC: 9.2 MG/DL (ref 8.6–10)
CHLORIDE SERPL-SCNC: 105 MMOL/L (ref 98–107)
CHOLEST SERPL-MCNC: 215 MG/DL
CREAT SERPL-MCNC: 0.96 MG/DL (ref 0.67–1.17)
DEPRECATED HCO3 PLAS-SCNC: 24 MMOL/L (ref 22–29)
GFR SERPL CREATININE-BSD FRML MDRD: >90 ML/MIN/1.73M2
GLUCOSE SERPL-MCNC: 98 MG/DL (ref 70–99)
HBA1C MFR BLD: 5.4 % (ref 0–5.6)
HCV AB SERPL QL IA: NONREACTIVE
HDLC SERPL-MCNC: 37 MG/DL
HIV 1+2 AB+HIV1 P24 AG SERPL QL IA: NONREACTIVE
LDLC SERPL CALC-MCNC: 149 MG/DL
NONHDLC SERPL-MCNC: 178 MG/DL
POTASSIUM SERPL-SCNC: 3.8 MMOL/L (ref 3.4–5.3)
SODIUM SERPL-SCNC: 140 MMOL/L (ref 136–145)
TRIGL SERPL-MCNC: 147 MG/DL

## 2022-09-16 PROCEDURE — 87389 HIV-1 AG W/HIV-1&-2 AB AG IA: CPT | Performed by: FAMILY MEDICINE

## 2022-09-16 PROCEDURE — 86803 HEPATITIS C AB TEST: CPT | Performed by: FAMILY MEDICINE

## 2022-09-16 PROCEDURE — 80048 BASIC METABOLIC PNL TOTAL CA: CPT | Performed by: FAMILY MEDICINE

## 2022-09-16 PROCEDURE — 80061 LIPID PANEL: CPT | Performed by: FAMILY MEDICINE

## 2022-09-16 ASSESSMENT — ENCOUNTER SYMPTOMS
DIZZINESS: 0
PALPITATIONS: 0
COUGH: 0
PARESTHESIAS: 0
CHILLS: 0
ABDOMINAL PAIN: 0
WEAKNESS: 0
FEVER: 0
ARTHRALGIAS: 0
JOINT SWELLING: 0
EYE PAIN: 0
FREQUENCY: 0
SORE THROAT: 0
HEARTBURN: 0
HEMATURIA: 0
MYALGIAS: 0
SHORTNESS OF BREATH: 0
NERVOUS/ANXIOUS: 0
NAUSEA: 0
DIARRHEA: 0
DYSURIA: 0
HEADACHES: 0
HEMATOCHEZIA: 0
CONSTIPATION: 0

## 2022-09-16 NOTE — NURSING NOTE
"47 year old  Chief Complaint   Patient presents with     Physical     No other concerns        Blood pressure 130/89, pulse 72, temperature 97.8  F (36.6  C), temperature source Skin, resp. rate 15, height 1.778 m (5' 10\"), weight 90.7 kg (200 lb), SpO2 97 %. Body mass index is 28.7 kg/m .  Patient Active Problem List   Diagnosis     Wellness examination     Hydradenitis     Chronic pain of right knee     Family history of diabetes mellitus       Wt Readings from Last 2 Encounters:   09/16/22 90.7 kg (200 lb)   05/27/22 92.5 kg (204 lb)     BP Readings from Last 3 Encounters:   09/16/22 130/89   05/27/22 (!) 135/93   03/29/21 (!) 134/96         No current outpatient medications on file.     No current facility-administered medications for this visit.       Social History     Tobacco Use     Smoking status: Never Smoker     Smokeless tobacco: Never Used   Substance Use Topics     Alcohol use: Yes     Comment: 2x per week     Drug use: No       Health Maintenance Due   Topic Date Due     ADVANCE CARE PLANNING  Never done     COLORECTAL CANCER SCREENING  Never done     HIV SCREENING  Never done     HEPATITIS C SCREENING  Never done     PREVENTIVE CARE VISIT  03/29/2022     INFLUENZA VACCINE (1) 09/01/2022       No results found for: PAP      September 16, 2022 7:58 AM    "

## 2022-09-16 NOTE — PROGRESS NOTES
"CHIEF COMPLAINT: Annual Wellness Exam      HISTORY OF PRESENT ILLNESS:  Tamika Johnson is a 47 year old male who presents for an annual wellness visit.  Overall, he is doing well. He does wear cheaters to assist with reading and his eye care is up to date. Hearing is stable. His dental care is up to date. His BP is 118/80 today. Body mass index is 28.7 kg/m . From an immunization standpoint, he is due for an influenza vaccine and COVID-19 Omicron booster.     He is due for colon cancer screening. He has no family history of colon cancer. After discussion, he elects to defer colon cancer screening. He is due for prostate cancer screening. He has no family history of prostate cancer, and would like to defer screening for now.    Ap has woken up a couple of times this year \"gasping\" after sleeping on his back. His wife has noticed him snoring more frequently. Ap has tried sleeping on his side more often to avoid waking up gasping. In the morning when he wakes up gasping, he typically needs to take a minute to take deep breaths. He is slightly fatigued during the day occasionally. He tries to sleep on his side to avoid this gasping.     FAMILY, SOCIAL AND PAST SURGICAL HISTORIES: Updated      MEDICATIONS:   Carefully Reviewed      REVIEW OF SYSTEMS:  10-point review of systems negative unless otherwise stated in the HPI.       OBJECTIVE:    EXAM:    GENERAL: He is in no distress.  Affect is upbeat.   Alert and oriented x3  /89 (BP Location: Right arm, Patient Position: Sitting, Cuff Size: Adult Regular)   Pulse 72   Temp 97.8  F (36.6  C) (Skin)   Resp 15   Ht 1.778 m (5' 10\")   Wt 90.7 kg (200 lb)   SpO2 97%   BMI 28.70 kg/m    HEENT:  Head is normocephalic, atraumatic. Ears clear bilaterally. No pain with palpation of the frontal or maxillary sinuses.  Eyes are grossly normal.  Nose and mouth are masked. Neck is supple without adenopathy or thyromegaly.  No carotid bruits are heard, no JVD. "   BACK:  Smooth and straight.  No pain to percussion.  No CVA tenderness.   LUNGS:  Clear to auscultation bilaterally.   HEART:  Regular rate and rhythm without murmur.   EXTREMITIES:  Ankles free of any edema.   SKIN:  Warm and dry.       LABORATORY DATA: Labs, pending    ASSESSMENT AND PLAN:   1. Annual Wellness Exam: Up to date with healthcare maintenance strategies.  2. Snoring: Occasional gasping for breath upon awakening, occurring a few times per year. Referred to sleep medicine for evaluation.  3. Screening for HIV: HIV Antigen Antibody combo ordered.  4. Need for hepatitis C screening test: Hepatitis C ordered.  5. Screening for lipid disorders: Fasting. Lipid panel ordered.  6. Elevated glucose level: BMP and Hemoglobin A1c ordered.  7. Follow up in 1 year or call if there are any questions or concerns    I, Osvaldo Rawls, am serving as a scribe to document services personally performed by Dr. Harrison Huffman, based on data collection and the provider's statements to me. Dr. Huffman has reviewed, edited, and approved the above note.     I, Dr. Harrison Huffman, have interviewed and examined this patient, and I have thoroughly reviewed and edited this note and agree with its contents.

## 2023-12-09 ENCOUNTER — HEALTH MAINTENANCE LETTER (OUTPATIENT)
Age: 48
End: 2023-12-09

## 2024-03-18 ENCOUNTER — MYC MEDICAL ADVICE (OUTPATIENT)
Dept: FAMILY MEDICINE | Facility: CLINIC | Age: 49
End: 2024-03-18

## 2024-03-18 DIAGNOSIS — R06.83 SNORING: Primary | ICD-10-CM

## 2024-04-02 ENCOUNTER — OFFICE VISIT (OUTPATIENT)
Dept: FAMILY MEDICINE | Facility: CLINIC | Age: 49
End: 2024-04-02
Payer: COMMERCIAL

## 2024-04-02 VITALS
SYSTOLIC BLOOD PRESSURE: 150 MMHG | WEIGHT: 207 LBS | TEMPERATURE: 98.1 F | OXYGEN SATURATION: 95 % | HEIGHT: 69 IN | DIASTOLIC BLOOD PRESSURE: 113 MMHG | HEART RATE: 85 BPM | BODY MASS INDEX: 30.66 KG/M2

## 2024-04-02 DIAGNOSIS — Z83.3 FAMILY HISTORY OF DIABETES MELLITUS: ICD-10-CM

## 2024-04-02 DIAGNOSIS — I10 ESSENTIAL HYPERTENSION: ICD-10-CM

## 2024-04-02 DIAGNOSIS — Z13.220 SCREENING FOR LIPID DISORDERS: ICD-10-CM

## 2024-04-02 DIAGNOSIS — B36.0 TINEA VERSICOLOR: ICD-10-CM

## 2024-04-02 DIAGNOSIS — Z12.11 SCREENING FOR COLON CANCER: ICD-10-CM

## 2024-04-02 DIAGNOSIS — M25.561 CHRONIC PAIN OF RIGHT KNEE: ICD-10-CM

## 2024-04-02 DIAGNOSIS — Z00.00 WELLNESS EXAMINATION: Primary | ICD-10-CM

## 2024-04-02 DIAGNOSIS — G89.29 CHRONIC PAIN OF RIGHT KNEE: ICD-10-CM

## 2024-04-02 LAB — HBA1C MFR BLD: 5.4 % (ref 0–5.6)

## 2024-04-02 PROCEDURE — 80048 BASIC METABOLIC PNL TOTAL CA: CPT | Performed by: FAMILY MEDICINE

## 2024-04-02 PROCEDURE — 80061 LIPID PANEL: CPT | Performed by: FAMILY MEDICINE

## 2024-04-02 RX ORDER — LOSARTAN POTASSIUM 25 MG/1
25 TABLET ORAL DAILY
Qty: 60 TABLET | Refills: 4 | Status: SHIPPED | OUTPATIENT
Start: 2024-04-02

## 2024-04-02 RX ORDER — KETOCONAZOLE 200 MG/1
TABLET ORAL
Qty: 2 TABLET | Refills: 1 | Status: SHIPPED | OUTPATIENT
Start: 2024-04-02

## 2024-04-02 SDOH — HEALTH STABILITY: PHYSICAL HEALTH: ON AVERAGE, HOW MANY DAYS PER WEEK DO YOU ENGAGE IN MODERATE TO STRENUOUS EXERCISE (LIKE A BRISK WALK)?: 4 DAYS

## 2024-04-02 SDOH — HEALTH STABILITY: PHYSICAL HEALTH: ON AVERAGE, HOW MANY MINUTES DO YOU ENGAGE IN EXERCISE AT THIS LEVEL?: 40 MIN

## 2024-04-02 ASSESSMENT — SOCIAL DETERMINANTS OF HEALTH (SDOH): HOW OFTEN DO YOU GET TOGETHER WITH FRIENDS OR RELATIVES?: ONCE A WEEK

## 2024-04-02 NOTE — NURSING NOTE
"Ap  48 year old    Chief Complaint   Patient presents with    Physical    Follow Up     RIGHT knee pain, pt will get sharp pain when ascending/descending stairs when foot is angled. Received a referral for this in the past and pt would like to revisit this discussion.            Blood pressure (!) 150/113, pulse 85, temperature 98.1  F (36.7  C), temperature source Skin, height 1.764 m (5' 9.45\"), weight 93.9 kg (207 lb), SpO2 95%. Body mass index is 30.17 kg/m .    Patient Active Problem List   Diagnosis    Wellness examination    Hydradenitis    Chronic pain of right knee    Family history of diabetes mellitus              Wt Readings from Last 2 Encounters:   04/02/24 93.9 kg (207 lb)   09/16/22 90.7 kg (200 lb)       BP Readings from Last 3 Encounters:   04/02/24 (!) 150/113   09/16/22 118/80   05/27/22 (!) 135/93                No current outpatient medications on file.     No current facility-administered medications for this visit.              Social History     Tobacco Use    Smoking status: Never    Smokeless tobacco: Never   Substance Use Topics    Alcohol use: Yes     Alcohol/week: 2.0 - 3.0 standard drinks of alcohol     Types: 2 - 3 Standard drinks or equivalent per week     Comment: 2x per week    Drug use: No              Health Maintenance Due   Topic Date Due    ADVANCE CARE PLANNING  Never done    COLORECTAL CANCER SCREENING  Never done    HEPATITIS B IMMUNIZATION (2 of 3 - 19+ 3-dose series) 05/28/2009    COVID-19 Vaccine (4 - 2023-24 season) 09/01/2023    YEARLY PREVENTIVE VISIT  09/16/2023    PHQ-2 (once per calendar year)  01/01/2024            No results found for: \"PAP\"           April 2, 2024 4:33 PM    "

## 2024-04-03 ENCOUNTER — ORDERS ONLY (AUTO-RELEASED) (OUTPATIENT)
Dept: FAMILY MEDICINE | Facility: CLINIC | Age: 49
End: 2024-04-03

## 2024-04-03 DIAGNOSIS — Z12.11 SCREENING FOR COLON CANCER: ICD-10-CM

## 2024-04-03 LAB
ANION GAP SERPL CALCULATED.3IONS-SCNC: 10 MMOL/L (ref 7–15)
BUN SERPL-MCNC: 17.1 MG/DL (ref 6–20)
CALCIUM SERPL-MCNC: 9.5 MG/DL (ref 8.6–10)
CHLORIDE SERPL-SCNC: 107 MMOL/L (ref 98–107)
CHOLEST SERPL-MCNC: 221 MG/DL
CREAT SERPL-MCNC: 1.2 MG/DL (ref 0.67–1.17)
DEPRECATED HCO3 PLAS-SCNC: 26 MMOL/L (ref 22–29)
EGFRCR SERPLBLD CKD-EPI 2021: 76 ML/MIN/1.73M2
FASTING STATUS PATIENT QL REPORTED: NO
GLUCOSE SERPL-MCNC: 85 MG/DL (ref 70–99)
HDLC SERPL-MCNC: 35 MG/DL
LDLC SERPL CALC-MCNC: 123 MG/DL
NONHDLC SERPL-MCNC: 186 MG/DL
POTASSIUM SERPL-SCNC: 4.2 MMOL/L (ref 3.4–5.3)
SODIUM SERPL-SCNC: 143 MMOL/L (ref 135–145)
TRIGL SERPL-MCNC: 313 MG/DL

## 2024-04-14 PROBLEM — I10 ESSENTIAL HYPERTENSION: Status: ACTIVE | Noted: 2024-04-14

## 2024-04-14 NOTE — PROGRESS NOTES
"Preventive Care Visit  [facility]  Harrison Huffman MD, Family Medicine  Apr 2, 2024      Duke De Souza is a 48 year old, presenting for the following:  Physical and Follow Up (RIGHT knee pain, pt will get sharp pain when ascending/descending stairs when foot is angled. Received a referral for this in the past and pt would like to revisit this discussion.)    PILY De Souza is here for the above.  Overall, he is doing fairly well although is under quite a bit of stress.    He has some chronic right knee pain.  This knee has \"always giving him some trouble.\"  He thinks it goes back to a football injury that he had in high school.  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  At times, he can feel like it can give out.  He will get some sharp pain when going up and down stairs and whenever his foot is angled or turned.  He received a referral for this in the past but did not have a chance to follow through.  He and I both agree that getting an x-ray and MRI simultaneously of the right knee would be appropriate.  Based on the results, we can determine the next best steps as to who we should see and how to proceed.  He would like to do that.    He has some significant snoring and has a sleep study pending in June.    He knows that his blood pressure has been elevated.  When he checks at home it is typically in the 130s over 80s to 90s.  Here, today, it was 115/113.  He agrees that it simply too high even at home.  We discussed options were to start medication as indicated below.    He has gained some weight and would love to lose some.  He would really like to be in the low 190s or upper 180s.  Currently, he is about 207 pounds with a BMI of 30.17.        4/2/2024   General Health   How would you rate your overall physical health? (!) FAIR   Feel stress (tense, anxious, or unable to sleep) Only a little         4/2/2024   Nutrition   Three or more servings of calcium each day? Yes   Diet: Regular (no " restrictions)   How many servings of fruit and vegetables per day? (!) 2-3   How many sweetened beverages each day? 0-1         4/2/2024   Exercise   Days per week of moderate/strenous exercise 4 days   Average minutes spent exercising at this level 40 min         4/2/2024   Social Factors   Frequency of gathering with friends or relatives Once a week   Worry food won't last until get money to buy more No   Food not last or not have enough money for food? No   Do you have housing?  Yes   Are you worried about losing your housing? No   Lack of transportation? No   Unable to get utilities (heat,electricity)? No         4/2/2024   Dental   Dentist two times every year? Yes         4/2/2024   TB Screening   Were you born outside of the [place]? No         Today's PHQ-2 Score:       4/2/2024     4:36 PM   PHQ-2 ( 1999 [employer])   Q1: Little interest or pleasure in doing things 0   Q2: Feeling down, depressed or hopeless 0   PHQ-2 Score 0   Q1: Little interest or pleasure in doing things Not at all   Q2: Feeling down, depressed or hopeless Not at all   PHQ-2 Score 0           4/2/2024   Substance Use   Alcohol more than 3/day or more than 7/wk No   Do you use any other substances recreationally? No     Social History     Tobacco Use    Smoking status: Never    Smokeless tobacco: Never   Substance Use Topics    Alcohol use: Yes     Alcohol/week: 2.0 - 3.0 standard drinks of alcohol     Types: 2 - 3 Standard drinks or equivalent per week     Comment: 2x per week    Drug use: No     ASCVD Risk   The 10-year ASCVD risk score (Naman GEORGE, et al., 2019) is: 6.3%    Values used to calculate the score:      Age: 48 years      Sex: Male      Is Non- : No      Diabetic: No      Tobacco smoker: No      Systolic Blood Pressure: 150 mmHg      Is BP treated: Yes      HDL Cholesterol: 35 mg/dL      Total Cholesterol: 221 mg/dL        4/2/2024   Contraception/Family Planning   Questions about contraception or  "family planning No       Review of Systems  Constitutional, neuro, ENT, endocrine, pulmonary, cardiac, gastrointestinal, genitourinary, musculoskeletal, integument and psychiatric systems are negative, except as otherwise noted.     Objective    Exam  BP (!) 150/113 (BP Location: Left arm, Patient Position: Sitting, Cuff Size: Adult Large)   Pulse 85   Temp 98.1  F (36.7  C) (Skin)   Ht 1.764 m (5' 9.45\")   Wt 93.9 kg (207 lb)   SpO2 95%   BMI 30.17 kg/m       Estimated body mass index is 30.17 kg/m  as calculated from the following:    Height as of this encounter: 1.764 m (5' 9.45\").    Weight as of this encounter: 93.9 kg (207 lb).    Physical Exam  GENERAL: alert and no distress  EYES: Eyes grossly normal to inspection, PERRL and conjunctivae and sclerae normal  HENT: ear canals and TM's normal, nose and mouth without ulcers or lesions  NECK: no adenopathy, no asymmetry, masses, or scars  RESP: lungs clear to auscultation - no rales, rhonchi or wheezes  CV: regular rate and rhythm, normal S1 S2, no S3 or S4, no murmur, click or rub, no peripheral edema  MS: no gross musculoskeletal defects noted, no edema.  Examination of the right knee reveals no edema or erythema.  Good range of motion.  No crepitus with passive flexion or extension.  No laxity with varus and valgus stress.  Júniro's test is negative.  SKIN: no suspicious lesions or rashes  NEURO: Normal strength and tone, mentation intact and speech normal  PSYCH: mentation appears normal, affect normal/bright    Laboratory testing: Basic metabolic panel, lipid panel, A1c, pending    Cologuard has been ordered for colon cancer screening purposes.    MRI and x-ray of the right knee have been ordered.      Assessment/Plan:    1.  Ap is a 48-year-old here today for his annual wellness visit.  We took care of a number of his care gaps.  He is mostly up-to-date with recommended strategies.    2.  Elevated blood pressure and almost certainly hypertension. "  Because of this, he and I both agree to be appropriate to start medication.  BMP pending.  He will go ahead and start losartan 25 mg once daily.  Continue checking BP at home.  If it continues to be elevated, we will certainly increase the dose from 25 to 50 mg and could go from 50 to 100 mg if necessary.  Other agents could be added as needed.    3.  Issues with snoring and possible concerns of possible sleep apnea.  Sleep study pending in June.    4.  Subtle skin rash has been present for quite some time.  It might be tinea versicolor.  Therefore, we will try ketoconazole 200 mg.  He will take 1 tablet, wait 2 hours, exercise to the point of sweating, this should take care of it.  If it does not, he can repeat this process once more.  If that does not work, I will refer him to dermatology.    5.  Chronic knee pain since high school, nearly 30 years.  This has not been officially worked up.  Despite normal exam, we will go ahead and order x-ray and MRI.  Once I have the results, I will share those with him and we will determine next steps.    I look forward to seeing Ap bills in 1 year.  He will reach out the meantime with any questions or concerns.        Signed Electronically by: Harrison Huffman MD

## 2024-04-22 ENCOUNTER — ANCILLARY PROCEDURE (OUTPATIENT)
Dept: MRI IMAGING | Facility: CLINIC | Age: 49
End: 2024-04-22
Attending: FAMILY MEDICINE
Payer: COMMERCIAL

## 2024-04-22 DIAGNOSIS — M25.561 CHRONIC PAIN OF RIGHT KNEE: ICD-10-CM

## 2024-04-22 DIAGNOSIS — G89.29 CHRONIC PAIN OF RIGHT KNEE: ICD-10-CM

## 2024-04-23 DIAGNOSIS — G89.29 CHRONIC PAIN OF RIGHT KNEE: Primary | ICD-10-CM

## 2024-04-23 DIAGNOSIS — M25.561 CHRONIC PAIN OF RIGHT KNEE: Primary | ICD-10-CM

## 2024-04-28 LAB — NONINV COLON CA DNA+OCC BLD SCRN STL QL: NEGATIVE

## 2024-05-09 ASSESSMENT — ACTIVITIES OF DAILY LIVING (ADL)
AS_A_RESULT_OF_YOUR_KNEE_INJURY,_HOW_WOULD_YOU_RATE_YOUR_CURRENT_LEVEL_OF_DAILY_ACTIVITY?: NEARLY NORMAL
HOW_WOULD_YOU_RATE_THE_OVERALL_FUNCTION_OF_YOUR_KNEE_DURING_YOUR_USUAL_DAILY_ACTIVITIES?: NEARLY NORMAL
WEAKNESS: THE SYMPTOM AFFECTS MY ACTIVITY SLIGHTLY
SQUAT: ACTIVITY IS MINIMALLY DIFFICULT
KNEE_ACTIVITY_OF_DAILY_LIVING_SCORE: 82.86
GO DOWN STAIRS: ACTIVITY IS NOT DIFFICULT
STAND: ACTIVITY IS NOT DIFFICULT
RISE FROM A CHAIR: ACTIVITY IS NOT DIFFICULT
AS_A_RESULT_OF_YOUR_KNEE_INJURY,_HOW_WOULD_YOU_RATE_YOUR_CURRENT_LEVEL_OF_DAILY_ACTIVITY?: NEARLY NORMAL
RISE FROM A CHAIR: ACTIVITY IS NOT DIFFICULT
GO DOWN STAIRS: ACTIVITY IS NOT DIFFICULT
WEAKNESS: THE SYMPTOM AFFECTS MY ACTIVITY SLIGHTLY
WALK: ACTIVITY IS NOT DIFFICULT
HOW_WOULD_YOU_RATE_THE_CURRENT_FUNCTION_OF_YOUR_KNEE_DURING_YOUR_USUAL_DAILY_ACTIVITIES_ON_A_SCALE_FROM_0_TO_100_WITH_100_BEING_YOUR_LEVEL_OF_KNEE_FUNCTION_PRIOR_TO_YOUR_INJURY_AND_0_BEING_THE_INABILITY_TO_PERFORM_ANY_OF_YOUR_USUAL_DAILY_ACTIVITIES?: 95
STIFFNESS: THE SYMPTOM AFFECTS MY ACTIVITY SLIGHTLY
GIVING WAY, BUCKLING OR SHIFTING OF KNEE: THE SYMPTOM AFFECTS MY ACTIVITY SLIGHTLY
SIT WITH YOUR KNEE BENT: ACTIVITY IS NOT DIFFICULT
HOW_WOULD_YOU_RATE_THE_CURRENT_FUNCTION_OF_YOUR_KNEE_DURING_YOUR_USUAL_DAILY_ACTIVITIES_ON_A_SCALE_FROM_0_TO_100_WITH_100_BEING_YOUR_LEVEL_OF_KNEE_FUNCTION_PRIOR_TO_YOUR_INJURY_AND_0_BEING_THE_INABILITY_TO_PERFORM_ANY_OF_YOUR_USUAL_DAILY_ACTIVITIES?: 95
STIFFNESS: THE SYMPTOM AFFECTS MY ACTIVITY SLIGHTLY
STAND: ACTIVITY IS NOT DIFFICULT
KNEEL ON THE FRONT OF YOUR KNEE: ACTIVITY IS MINIMALLY DIFFICULT
SWELLING: I DO NOT HAVE THE SYMPTOM
SWELLING: I DO NOT HAVE THE SYMPTOM
HOW_WOULD_YOU_RATE_THE_OVERALL_FUNCTION_OF_YOUR_KNEE_DURING_YOUR_USUAL_DAILY_ACTIVITIES?: NEARLY NORMAL
LIMPING: THE SYMPTOM AFFECTS MY ACTIVITY SLIGHTLY
WALK: ACTIVITY IS NOT DIFFICULT
GO UP STAIRS: ACTIVITY IS NOT DIFFICULT
GIVING WAY, BUCKLING OR SHIFTING OF KNEE: THE SYMPTOM AFFECTS MY ACTIVITY SLIGHTLY
GO UP STAIRS: ACTIVITY IS NOT DIFFICULT
PLEASE_INDICATE_YOR_PRIMARY_REASON_FOR_REFERRAL_TO_THERAPY:: KNEE
LIMPING: THE SYMPTOM AFFECTS MY ACTIVITY SLIGHTLY
RAW_SCORE: 58
SIT WITH YOUR KNEE BENT: ACTIVITY IS NOT DIFFICULT
KNEE_ACTIVITY_OF_DAILY_LIVING_SUM: 58
KNEEL ON THE FRONT OF YOUR KNEE: ACTIVITY IS MINIMALLY DIFFICULT
PAIN: THE SYMPTOM AFFECTS MY ACTIVITY SLIGHTLY
SQUAT: ACTIVITY IS MINIMALLY DIFFICULT
PAIN: THE SYMPTOM AFFECTS MY ACTIVITY SLIGHTLY

## 2024-05-13 ENCOUNTER — THERAPY VISIT (OUTPATIENT)
Dept: PHYSICAL THERAPY | Facility: CLINIC | Age: 49
End: 2024-05-13
Attending: FAMILY MEDICINE
Payer: COMMERCIAL

## 2024-05-13 DIAGNOSIS — M25.561 CHRONIC PAIN OF RIGHT KNEE: ICD-10-CM

## 2024-05-13 DIAGNOSIS — G89.29 CHRONIC PAIN OF RIGHT KNEE: ICD-10-CM

## 2024-05-13 PROCEDURE — 97161 PT EVAL LOW COMPLEX 20 MIN: CPT | Mod: GP | Performed by: PHYSICAL THERAPIST

## 2024-05-13 PROCEDURE — 97110 THERAPEUTIC EXERCISES: CPT | Mod: GP | Performed by: PHYSICAL THERAPIST

## 2024-05-13 NOTE — PROGRESS NOTES
PHYSICAL THERAPY EVALUATION  Type of Visit: Evaluation    See electronic medical record for Abuse and Falls Screening details.    Subjective       Presenting condition or subjective complaint: Onee pain/stiffness/buckling  Date of onset: 24 (MD order)    Relevant medical history:     Dates & types of surgery:      Prior diagnostic imaging/testing results: MRI       1. 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   2. 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   Prior therapy history for the same diagnosis, illness or injury: No      Prior Level of Function  Transfers: Independent  Ambulation: Independent  ADL: Independent  IADL: Childcare, Finances, Housekeeping, Laundry, Meal preparation, Work    Living Environment  Social support: With a significant other or spouse   Type of home: House   Stairs to enter the home: Yes 10 Is there a railing: Yes   Ramp: No   Stairs inside the home: Yes 20 Is there a railing: Yes   Help at home: None  Equipment owned:       Employment: Yes Registered Nurse  Hobbies/Interests:      Patient goals for therapy: Strengthen    Pain assessment: Location: medial patellar groove /Ratin-8/10     Objective   KNEE EVALUATION  PAIN: Pain is Exacerbated By: stairs, squatting  Pain is Relieved By: NSAIDs and rest  INTEGUMENTARY (edema, incisions): Sweep Test: 0    GAIT:  Gait Deviations: WNL    WEIGHTBEARING ALIGNMENT: Knee WB Alignment:Patella christin L, Patella christin R  NON-WEIGHTBEARING ALIGNMENT: Patellar ER L, Patellar ER R  ROM:   (Degrees) Left AROM Left PROM  Right AROM Right PROM   Knee Flexion 130  130    Knee Extension  Hyper extends 5  Hyperextends 5   Pain:   End feel:     STRENGTH:   Pain: - none + mild ++ moderate +++ severe  Strength Scale: 0-5/5 Left Right   Knee Flexion 5 5   Knee Extension 4+ 4+   Quad Set 5 5      FLEXIBILITY:  Increased hamstring tightness   SPECIAL TESTS:    Left Right   Apley's (Meniscus) Negative  Negative    Júnior's (Meniscus) Negative  Negative    Rylee's (ITB/TFL) Negative  Negative    Patellar Apprehension Test Negative  Negative    Patella Tracking Lateral displacement Lateral displacement, Lateral patellar tilt     FUNCTIONAL TESTS: Double Leg Squat: Anterior knee translation  Single Leg Squat: Anterior knee translation, Knee valgus, Hip internal rotation, and Improper use of glutes/hips  SLS: WFL  PALPATION:  TTP along medial patella  JOINT MOBILITY:  stiffness lateral patella     Assessment & Plan   CLINICAL IMPRESSIONS  Medical Diagnosis: chronic pain of right knee    Treatment Diagnosis: Mechanical knee pain   Impression/Assessment: Patient is a 48 year old male with right knee pain complaints.  The following significant findings have been identified: Pain, Decreased ROM/flexibility, Decreased joint mobility, Decreased strength, Impaired balance, and Decreased proprioception. These impairments interfere with their ability to perform self care tasks, recreational activities, household chores, and community mobility as compared to previous level of function.     Clinical Decision Making (Complexity):  Clinical Presentation: Stable/Uncomplicated  Clinical Presentation Rationale: based on medical and personal factors listed in PT evaluation  Clinical Decision Making (Complexity): Low complexity    PLAN OF CARE  Treatment Interventions:  Modalities: Cryotherapy, Dry Needling  Interventions: Gait Training, Manual Therapy, Neuromuscular Re-education, Therapeutic Activity, Therapeutic Exercise, Self-Care/Home Management    Long Term Goals     PT Goal 1  Goal Identifier: Ascending stairs  Goal Description: patient will ascend 2 flights of stairs with reciprocal gait pattern and no railing with 0/10 pain  Rationale: to maximize safety and independence with performance of ADLs and functional tasks;to  maximize safety and independence within the community;to maximize safety and independence with self cares  Target Date: 08/05/24      Frequency of Treatment: 1x per week  Duration of Treatment: 6 weeks    Recommended Referrals to Other Professionals:  potentially sports and ortho  Education Assessment:   Learner/Method: Patient;No Barriers to Learning    Risks and benefits of evaluation/treatment have been explained.   Patient/Family/caregiver agrees with Plan of Care.     Evaluation Time:     PT Eval, Low Complexity Minutes (53758): 20     Signing Clinician: Paulina Zhang PT

## 2024-05-21 ENCOUNTER — THERAPY VISIT (OUTPATIENT)
Dept: PHYSICAL THERAPY | Facility: CLINIC | Age: 49
End: 2024-05-21
Attending: FAMILY MEDICINE
Payer: COMMERCIAL

## 2024-05-21 DIAGNOSIS — G89.29 CHRONIC PAIN OF RIGHT KNEE: Primary | ICD-10-CM

## 2024-05-21 DIAGNOSIS — M25.561 CHRONIC PAIN OF RIGHT KNEE: Primary | ICD-10-CM

## 2024-05-21 PROCEDURE — 97112 NEUROMUSCULAR REEDUCATION: CPT | Mod: GP | Performed by: PHYSICAL THERAPIST

## 2024-05-21 PROCEDURE — 97110 THERAPEUTIC EXERCISES: CPT | Mod: GP | Performed by: PHYSICAL THERAPIST

## 2024-06-14 ENCOUNTER — THERAPY VISIT (OUTPATIENT)
Dept: PHYSICAL THERAPY | Facility: CLINIC | Age: 49
End: 2024-06-14
Payer: COMMERCIAL

## 2024-06-14 DIAGNOSIS — G89.29 CHRONIC PAIN OF RIGHT KNEE: Primary | ICD-10-CM

## 2024-06-14 DIAGNOSIS — M25.561 CHRONIC PAIN OF RIGHT KNEE: Primary | ICD-10-CM

## 2024-06-14 PROCEDURE — 97110 THERAPEUTIC EXERCISES: CPT | Mod: GP | Performed by: PHYSICAL THERAPIST

## 2024-06-24 PROBLEM — I10 ESSENTIAL HYPERTENSION: Chronic | Status: ACTIVE | Noted: 2024-04-14

## 2024-06-25 ENCOUNTER — VIRTUAL VISIT (OUTPATIENT)
Dept: SLEEP MEDICINE | Facility: CLINIC | Age: 49
End: 2024-06-25
Payer: COMMERCIAL

## 2024-06-25 VITALS — BODY MASS INDEX: 28.63 KG/M2 | HEIGHT: 70 IN | WEIGHT: 200 LBS

## 2024-06-25 DIAGNOSIS — R06.89 DYSPNEA AND RESPIRATORY ABNORMALITY: Primary | ICD-10-CM

## 2024-06-25 DIAGNOSIS — R06.00 DYSPNEA AND RESPIRATORY ABNORMALITY: Primary | ICD-10-CM

## 2024-06-25 DIAGNOSIS — R53.81 MALAISE AND FATIGUE: ICD-10-CM

## 2024-06-25 DIAGNOSIS — Z72.820 LACK OF ADEQUATE SLEEP: ICD-10-CM

## 2024-06-25 DIAGNOSIS — R06.83 SNORING: ICD-10-CM

## 2024-06-25 DIAGNOSIS — I10 ESSENTIAL HYPERTENSION: ICD-10-CM

## 2024-06-25 DIAGNOSIS — G47.30 SLEEP APNEA, UNSPECIFIED TYPE: ICD-10-CM

## 2024-06-25 DIAGNOSIS — R53.83 MALAISE AND FATIGUE: ICD-10-CM

## 2024-06-25 PROCEDURE — 99204 OFFICE O/P NEW MOD 45 MIN: CPT | Mod: 95 | Performed by: PHYSICIAN ASSISTANT

## 2024-06-25 ASSESSMENT — SLEEP AND FATIGUE QUESTIONNAIRES
HOW LIKELY ARE YOU TO NOD OFF OR FALL ASLEEP WHILE SITTING AND TALKING TO SOMEONE: SLIGHT CHANCE OF DOZING
HOW LIKELY ARE YOU TO NOD OFF OR FALL ASLEEP WHILE SITTING INACTIVE IN A PUBLIC PLACE: SLIGHT CHANCE OF DOZING
HOW LIKELY ARE YOU TO NOD OFF OR FALL ASLEEP IN A CAR, WHILE STOPPED FOR A FEW MINUTES IN TRAFFIC: SLIGHT CHANCE OF DOZING
HOW LIKELY ARE YOU TO NOD OFF OR FALL ASLEEP WHILE SITTING QUIETLY AFTER LUNCH WITHOUT ALCOHOL: MODERATE CHANCE OF DOZING
HOW LIKELY ARE YOU TO NOD OFF OR FALL ASLEEP WHILE WATCHING TV: HIGH CHANCE OF DOZING
HOW LIKELY ARE YOU TO NOD OFF OR FALL ASLEEP WHILE LYING DOWN TO REST IN THE AFTERNOON WHEN CIRCUMSTANCES PERMIT: MODERATE CHANCE OF DOZING
HOW LIKELY ARE YOU TO NOD OFF OR FALL ASLEEP WHEN YOU ARE A PASSENGER IN A CAR FOR AN HOUR WITHOUT A BREAK: SLIGHT CHANCE OF DOZING
HOW LIKELY ARE YOU TO NOD OFF OR FALL ASLEEP WHILE SITTING AND READING: HIGH CHANCE OF DOZING

## 2024-06-25 ASSESSMENT — PAIN SCALES - GENERAL: PAINLEVEL: NO PAIN (0)

## 2024-06-25 NOTE — NURSING NOTE
Is the patient currently in the state of [place]? YES    Visit mode:VIDEO    If the visit is dropped, the patient can be reconnected by: VIDEO VISIT: Text to cell phone:   Telephone Information:   Mobile [phone]       Will anyone else be joining the visit? NO  (If patient encounters technical issues they should call [phone]    How would you like to obtain your AVS? MyChart    Are changes needed to the allergy or medication list? Pt stated no changes to allergies and Pt stated no med changes    Are refills needed on medications prescribed by this physician? NO  Has patient had flu shot for current/most recent flu season? If so, when? Yes: 09/25/2023    Reason for visit: Consult    Lashon HUDSON

## 2024-06-25 NOTE — PATIENT INSTRUCTIONS
"          MY TREATMENT INFORMATION FOR SLEEP APNEA-  Ap Johnson    DOCTOR : LUZ Gil    Am I having a sleep study at a sleep center?  --->Due to normal delays, you will be contacted within 2-4 weeks to schedule    Am I having a home sleep study?  --->Watch the video for the device you are using:    -/drop off device-   https://www.[employer] Geneticsube.com/watch?v=yGGFBdELGhk    -Disposable device sent out require phone/computer application-   https://www.[employer].com/watch?v=BCce_vbiwxE      Frequently asked questions:  1. What is Obstructive Sleep Apnea (CURRY)? CURRY is the most common type of sleep apnea. Apnea means, \"without breath.\"  Apnea is most often caused by narrowing or collapse of the upper airway as muscles relax during sleep.   Almost everyone has occasional apneas. Most people with sleep apnea have had brief interruptions at night frequently for many years.  The severity of sleep apnea is related to how frequent and severe the events are.   2. What are the consequences of CURRY? Symptoms include: feeling sleepy during the day, snoring loudly, gasping or stopping of breathing, trouble sleeping, and occasionally morning headaches or heartburn at night.  Sleepiness can be serious and even increase the risk of falling asleep while driving. Other health consequences may include development of high blood pressure and other cardiovascular disease in persons who are susceptible. Untreated CURRY  can contribute to heart disease, stroke and diabetes.   3. What are the treatment options? In most situations, sleep apnea is a lifelong disease that must be managed with daily therapy. Medications are not effective for sleep apnea and surgery is generally not considered until other therapies have been tried. Your treatment is your choice . Continuous Positive Airway (CPAP) works right away and is the therapy that is effective in nearly everyone. An oral device to hold your jaw forward is usually the next most " reliable option. Other options include postioning devices (to keep you off your back), weight loss, and surgery including a tongue pacing device. There is more detail about some of these options below.  4. Are my sleep studies covered by insurance? Although we will request verification of coverage, we advise you also check in advance of the study to ensure there is coverage.    Important tips for those choosing CPAP and similar devices  REMEMBER-IF YOU RECEIVE A CALL FROM  [phone]-->IT IS TO SETUP A DEVICE  For new devices, sign up for device ZHANE to monitor your device for your followup visits  We encourage you to utilize the [employer] zhane or website ( [email or web address].Stratavia/ ) to monitor your therapy progress and share the data with your healthcare team when you discuss your sleep apnea.                                                    Know your equipment:  CPAP is continuous positive airway pressure that prevents obstructive sleep apnea by keeping the throat from collapsing while you are sleeping. In most cases, the device is  smart  and can slowly self-adjusts if your throat collapses and keeps a record every day of how well you are treated-this information is available to you and your care team.  BPAP is bilevel positive airway pressure that keeps your throat open and also assists each breath with a pressure boost to maintain adequate breathing.  Special kinds of BPAP are used in patients who have inadequate breathing from lung or heart disease. In most cases, the device is  smart  and can slowly self-adjusts to assist breathing. Like CPAP, the device keeps a record of how well you are treated.  Your mask is your connection to the device. You get to choose what feels most comfortable and the staff will help to make sure if fits. Here: are some examples of the different masks that are available: Magnetic mask aids may assist with use but there are safety issues that should be addressed when  considering with magnets* ( see end of discussion).       Key points to remember on your journey with sleep apnea:  Sleep study.  PAP devices often need to be adjusted during a sleep study to show that they are effective and adjusted right.  Good tips to remember: Try wearing just the mask during a quiet time during the day so your body adapts to wearing it. A humidifier is recommended for comfort in most cases to prevent drying of your nose and throat. Allergy medication from your provider may help you if you are having nasal congestion.  Getting settled-in. It takes more than one night for most of us to get used to wearing a mask. Try wearing just the mask during a quiet time during the day so your body adapts to wearing it. A humidifier is recommended for comfort in most cases. Our team will work with you carefully on the first day and will be in contact within 4 days and again at 2 and 4 weeks for advice and remote device adjustments. Your therapy is evaluated by the device each day.   Use it every night. The more you are able to sleep naturally for 7-8 hours, the more likely you will have good sleep and to prevent health risks or symptoms from sleep apnea. Even if you use it 4 hours it helps. Occasionally all of us are unable to use a medical therapy, in sleep apnea, it is not dangerous to miss one night.   Communicate. Call our skilled team on the number provided on the first day if your visit for problems that make it difficult to wear the device. Over 2 out of 3 patients can learn to wear the device long-term with help from our team. Remember to call our team or your sleep providers if you are unable to wear the device as we may have other solutions for those who cannot adapt to mask CPAP therapy. It is recommended that you sleep your sleep provider within the first 3 months and yearly after that if you are not having problems.   Use it for your health. We encourage use of CPAP masks during daytime quiet  periods to allow your face and brain to adapt to the sensation of CPAP so that it will be a more natural sensation to awaken to at night or during naps. This can be very useful during the first few weeks or months of adapting to CPAP though it does not help medically to wear CPAP during wakefulness and  should not be used as a strategy just to meet guidelines.  Take care of your equipment. Make sure you clean your mask and tubing using directions every day and that your filter and mask are replaced as recommended or if they are not working.     *Masks with magnets:  Updated Contraindications  Masks with magnetic components are contraindicated for use by patients where they, or anyone in close physical contact while using the mask, have the following:   Active medical implants that interact with magnets (i.e., pacemakers, implantable cardioverter defibrillators (ICD), neurostimulators, cerebrospinal fluid (CSF) shunts, insulin/infusion pumps)   Metallic implants/objects containing ferromagnetic material (i.e., aneurysm clips/flow disruption devices, embolic coils, stents, valves, electrodes, implants to restore hearing or balance with implanted magnets, ocular implants, metallic splinters in the eye)  Updated Warning  Keep the mask magnets at a safe distance of at least 6 inches (150 mm) away from implants or medical devices that may be adversely affected by magnetic interference. This warning applies to you or anyone in close physical contact with your mask. The magnets are in the frame and lower headgear clips, with a magnetic field strength of up to 400mT. When worn, they connect to secure the mask but may inadvertently detach while asleep.  Implants/medical devices, including those listed within contraindications, may be adversely affected if they change function under external magnetic fields or contain ferromagnetic materials that attract/repel to magnetic fields (some metallic implants, e.g., contact lenses  with metal, dental implants, metallic cranial plates, screws, sienna hole covers, and bone substitute devices). Consult your physician and  of your implant / other medical device for information on the potential adverse effects of magnetic fields.    BESIDES CPAP, WHAT OTHER THERAPIES ARE THERE?    Positioning Device  Positioning devices are generally used when sleep apnea is mild and only occurs on your back.This example shows a pillow that straps around the waist. It may be appropriate for those whose sleep study shows milder sleep apnea that occurs primarily when lying flat on one's back. Preliminary studies have shown benefit but effectiveness at home may need to be verified by a home sleep test. These devices are generally not covered by medical insurance.  Examples of devices that maintain sleeping on the back to prevent snoring and mild sleep apnea.    Belt type body positioner  http://Smart Mocha/    Electronic reminder  http://nightshifttherapy.m0um0u/            Oral Appliance  What is oral appliance therapy?  An oral appliance device fits on your teeth at night like a retainer used after having braces. The device is made by a specialized dentist and requires several visits over 1-2 months before a manufactured device is made to fit your teeth and is adjusted to prevent your sleep apnea. Once an oral device is working properly, snoring should be improved. A home sleep test may be recommended at that time if to determine whether the sleep apnea is adequately treated.       Some things to remember:  -Oral devices are often, but not always, covered by your medical insurance. Be sure to check with your insurance provider.   -If you are referred for oral therapy, you will be given a list of specialized dentists to consider or you may choose to visit the Web site of the American Academy of Dental Sleep Medicine  -Oral devices are less likely to work if you have severe sleep apnea or are extremely  overweight.     More detailed information  An oral appliance is a small acrylic device that fits over the upper and lower teeth  (similar to a retainer or a mouth guard). This device slightly moves jaw forward, which moves the base of the tongue forward, opens the airway, improves breathing for effective treat snoring and obstructive sleep apnea in perhaps 7 out of 10 people .  The best working devices are custom-made by a dental device  after a mold is made of the teeth 1, 2, 3.  When is an oral appliance indicated?  Oral appliance therapy is recommended as a first-line treatment for patients with primary snoring, mild sleep apnea, and for patients with moderate sleep apnea who prefer appliance therapy to use of CPAP4, 5. Severity of sleep apnea is determined by sleep testing and is based on the number of respiratory events per hour of sleep.   How successful is oral appliance therapy?  The success rate of oral appliance therapy in patients with mild sleep apnea is 75-80% while in patients with moderate sleep apnea it is 50-70%. The chance of success in patients with severe sleep apnea is 40-50%. The research also shows that oral appliances have a beneficial effect on the cardiovascular health of CURRY patients at the same magnitude as CPAP therapy7.  Oral appliances should be a second-line treatment in cases of severe sleep apnea, but if not completely successful then a combination therapy utilizing CPAP plus oral appliance therapy may be effective. Oral appliances tend to be effective in a broad range of patients although studies show that the patients who have the highest success are females, younger patients, those with milder disease, and less severe obesity. 3, 6.   Finding a dentist that practices dental sleep medicine  Specific training is available through the American Academy of Dental Sleep Medicine for dentists interested in working in the field of sleep. To find a dentist who is educated in  the field of sleep and the use of oral appliances, near you, visit the Web site of the American Academy of Dental Sleep Medicine.    References  1. David, et al. Objectively measured vs self-reported compliance during oral appliance therapy for sleep-disordered breathing. Chest 2013; 144(5): 2478-7957.  2. Celina et al. Objective measurement of compliance during oral appliance therapy for sleep-disordered breathing. Thorax 2013; 68(1): 91-96.  3. Anoop et al. Mandibular advancement devices in 620 men and women with CURRY and snoring: tolerability and predictors of treatment success. Chest 2004; 125: 0360-0717.  4. Aga, et al. Oral appliances for snoring and CURRY: a review. Sleep 2006; 29: 244-262.  5. Brock et al. Oral appliance treatment for CURRY: an update. J Clin Sleep Med 2014; 10(2): 215-227.  6. Cadence et al. Predictors of OSAH treatment outcome. J Dent Res 2007; 86: 6631-7859.      Weight Loss:   Your Body mass index is 28.7 kg/m .    Being overweight does not necessarily mean you will have health consequences.  Those who have BMI over 35 or over 27 with existing medical conditions carries greater risk.   Weight loss decreases severity of sleep apnea in most people with obesity. For those with mild obesity who have developed snoring with weight gain, even 15-30 pound weight loss can improve and occasionally milder eliminate sleep apnea.  Structured and life-long dietary and health habits are necessary to lose weight and keep healthier weight levels.     The Comprehensive Weight loss program offers all aspects of weight loss strategies including two Non-Surgical Weight Loss Programs: Medical Weight Management and our 24 Week Healthy Lifestyle Program:    Medical Weight Management: You will meet with a Medical Weight Management Provider, as well as a Registered Dietician. The program may include medication therapy, dietary education, recommended exercise and physical therapy programs,  monthly support group meetings, and possible psychological counseling. Follow up visits with the provider or dietician are scheduled based on your progress and needs.    24 Week Healthy Lifestyle Program: This unique program is designed to give you the support of weekly appointments and activities thru a 24-week period. It may include all of the components of the basic program (above), with the addition of 11 individual Health  Visits, 24-week access to the [employer] website for over 700 online classes, and monthly support group meetings. This program has an out-of-pocket expense of $499 to cover the items that can not be billed to insurance (health coaches and [employer] access), and is non-refundable/non-transferable (you may be able to use a Health Savings Account; ask your HSA provider). There may be an optional meal replacement plan prescribed as well.   Surgical management achieves meaningful long-term weight loss and improvement in health risks in most patients with more severe obesity.      Sleep Apnea Surgery:    Surgery for obstructive sleep apnea is considered generally only when other therapies fail to work. Surgery may be discussed with you if you are having a difficult time tolerating CPAP and or when there is an abnormal structure that requires surgical correction.  Nose and throat surgeries often enlarge the airway to prevent collapse.  Most of these surgeries create pain for 1-2 weeks and up to half of the most common surgeries are not effective throughout life.  You should carefully discuss the benefits and drawbacks to surgery with your sleep provider and surgeon to determine if it is the best solution for you.   More information  Surgery for CURRY is directed at areas that are responsible for narrowing or complete obstruction of the airway during sleep.  There are a wide range of procedures available to enlarge and/or stabilize the airway to prevent blockage of breathing in the three major  areas where it can occur: the palate, tongue, and nasal regions.  Successful surgical treatment depends on the accurate identification of the factors responsible for obstructive sleep apnea in each person.  A personalized approach is required because there is no single treatment that works well for everyone.  Because of anatomic variation, consultation with an examination by a sleep surgeon is a critical first step in determining what surgical options are best for each patient.  In some cases, examination during sedation may be recommended in order to guide the selection of procedures.  Patients will be counseled about risks and benefits as well as the typical recovery course after surgery. Surgery is typically not a cure for a person s CURRY.  However, surgery will often significantly improve one s CURRY severity (termed  success rate ).  Even in the absence of a cure, surgery will decrease the cardiovascular risk associated with OSA7; improve overall quality of life8 (sleepiness, functionality, sleep quality, etc).      Palate Procedures:  Patients with CURRY often have narrowing of their airway in the region of their tonsils and uvula.  The goals of palate procedures are to widen the airway in this region as well as to help the tissues resist collapse.  Modern palate procedure techniques focus on tissue conservation and soft tissue rearrangement, rather than tissue removal.  Often the uvula is preserved in this procedure. Residual sleep apnea is common in patient after pharyngoplasty with an average reduction in sleep apnea events of 33%2.      Tongue Procedures:  ExamWhile patients are awake, the muscles that surround the throat are active and keep this region open for breathing. These muscles relax during sleep, allowing the tongue and other structures to collapse and block breathing.  There are several different tongue procedures available.  Selection of a tongue base procedure depends on characteristics seen on  physical exam.  Generally, procedures are aimed at removing bulky tissues in this area or preventing the back of the tongue from falling back during sleep.  Success rates for tongue surgery range from 50-62%3.    Hypoglossal Nerve Stimulation:  Hypoglossal nerve stimulation has recently received approval from the [place] Food and Drug Administration for the treatment of obstructive sleep apnea.  This is based on research showing that the system was safe and effective in treating sleep apnea6.  Results showed that the median AHI score decreased 68%, from 29.3 to 9.0. This therapy uses an implant system that senses breathing patterns and delivers mild stimulation to airway muscles, which keeps the airway open during sleep.  The system consists of three fully implanted components: a small generator (similar in size to a pacemaker), a breathing sensor, and a stimulation lead.  Using a small handheld remote, a patient turns the therapy on before bed and off upon awakening.    Candidates for this device must be greater than 18 years of age, have moderate to severe obstructive sleep apnea with less than 25% central events  (AHI between 15-65), BMI less than 35, have tried CPAP/oral appliance for at least 8 weeks without success, and have appropriate upper airway anatomy (determined by a sleep endoscopy performed by Dr. Erasmo Lafleur or Dr. Seth Gimenez).    Nasal Procedures:  Nasal obstruction can interfere with nasal breathing during the day and night.  Studies have shown that relief of nasal obstruction can improve the ability of some patients to tolerate positive airway pressure therapy for obstructive sleep apnea1.  Treatment options include medications such as nasal saline, topical corticosteroid and antihistamine sprays, and oral medications such as antihistamines or decongestants. Non-surgical treatments can include external nasal dilators for selected patients. If these are not successful by themselves,  surgery can improve the nasal airway either alone or in combination with these other options.        Combination Procedures:  Combination of surgical procedures and other treatments may be recommended, particularly if patients have more than one area of narrowing or persistent positional disease.  The success rate of combination surgery ranges from 66-80%2,3.    References  Mirtha HEARN. The Role of the Nose in Snoring and Obstructive Sleep Apnoea: An Update.  Eur Arch Otorhinolaryngol. 2011; 268: 1365-73.   Joon SM; Cele JA; Ivis JR; Pallanch JF; Arik MB; Myrna SG; Grace LANDEROS. Surgical modifications of the upper airway for obstructive sleep apnea in adults: a systematic review and meta-analysis. SLEEP 2010;33(10):9183-2061. Nikky JUAREZ. Hypopharyngeal surgery in obstructive sleep apnea: an evidence-based medicine review.  Arch Otolaryngol Head Neck Surg. 2006 Feb;132(2):206-13.  Jc YH1, Torrey Y, [place] JESUS MANUEL. The efficacy of anatomically based multilevel surgery for obstructive sleep apnea. Otolaryngol Head Neck Surg. 2003 Oct;129(4):327-35.  Nikky JUAREZ, Goldberg A. Hypopharyngeal Surgery in Obstructive Sleep Apnea: An Evidence-Based Medicine Review. Arch Otolaryngol Head Neck Surg. 2006 Feb;132(2):206-13.  Neisha MARIE et al. Upper-Airway Stimulation for Obstructive Sleep Apnea.  N Engl J Med. 2014 Jan 9;370(2):139-49.  Rufina Y et al. Increased Incidence of Cardiovascular Disease in Middle-aged Men with Obstructive Sleep Apnea. Am J Respir Crit Care Med; 2002 166: 159-165  Kim EM et al. Studying Life Effects and Effectiveness of Palatopharyngoplasty (SLEEP) study: Subjective Outcomes of Isolated Uvulopalatopharyngoplasty. Otolaryngol Head Neck Surg. 2011; 144: 623-631.        WHAT IF I ONLY HAVE SNORING?    Mandibular advancement devices, lateral sleep positioning, long-term weight loss and treatment of nasal allergies have been shown to improve snoring.  Exercising tongue muscles with a game  (https://[employer].Backpack.[employer]/us/zhane/soundly-reduce-snoring/zx1403500197) or stimulating the tongue during the day with a device (https://doi.org/10.3390/nyd01836438) have improved snoring in some individuals.  https://www.GreenLancer.[employer]/  https://www.sleepfoundation.org/best-anti-snoring-mouthpieces-and-mouthguards    Remember to Drive Safe... Drive Alive     Sleep health profoundly affects your health, mood, and your safety.  Thirty three percent of the population (one in three of us) is not getting enough sleep and many have a sleep disorder. Not getting enough sleep or having an untreated / undertreated sleep condition may make us sleepy without even knowing it. In fact, our driving could be dramatically impaired due to our sleep health. As your provider, here are some things I would like you to know about driving:     Here are some warning signs for impairment and dangerous drowsy driving:              -Having been awake more than 16 hours               -Looking tired               -Eyelid drooping              -Head nodding (it could be too late at this point)              -Driving for more than 30 minutes     Some things you could do to make the driving safer if you are experiencing some drowsiness:              -Stop driving and rest              -Call for transportation              -Make sure your sleep disorder is adequately treated     Some things that have been shown NOT to work when experiencing drowsiness while driving:              -Turning on the radio              -Opening windows              -Eating any  distracting  /  entertaining  foods (e.g., sunflower seeds, candy, or any other)              -Talking on the phone      Your decision may not only impact your life, but also the life of others. Please, remember to drive safe for yourself and all of us.           Your Body mass index is 28.7 kg/m .  Weight management is a personal decision.  If you are interested in exploring weight loss strategies,  the following discussion covers the approaches that may be successful. Body mass index (BMI) is one way to tell whether you are at a healthy weight, overweight, or obese. It measures your weight in relation to your height.  A BMI of 18.5 to 24.9 is in the healthy range. A person with a BMI of 25 to 29.9 is considered overweight, and someone with a BMI of 30 or greater is considered obese. More than two-thirds of American adults are considered overweight or obese.  Being overweight or obese increases the risk for further weight gain. Excess weight may lead to heart disease and diabetes.  Creating and following plans for healthy eating and physical activity may help you improve your health.  Weight control is part of healthy lifestyle and includes exercise, emotional health, and healthy eating habits. Careful eating habits lifelong are the mainstay of weight control. Though there are significant health benefits from weight loss, long-term weight loss with diet alone may be very difficult to achieve- studies show long-term success with dietary management in less than 10% of people. Attaining a healthy weight may be especially difficult to achieve in those with severe obesity. In some cases, medications, devices and surgical management might be considered.  What can you do?  If you are overweight or obese and are interested in methods for weight loss, you should discuss this with your provider.   Consider reducing daily calorie intake by 500 calories.   Keep a food journal.   Avoiding skipping meals, consider cutting portions instead.    Diet combined with exercise helps maintain muscle while optimizing fat loss. Strength training is particularly important for building and maintaining muscle mass. Exercise helps reduce stress, increase energy, and improves fitness. Increasing exercise without diet control, however, may not burn enough calories to loose weight.     Start walking three days a week 10-20 minutes at a  time  Work towards walking thirty minutes five days a week   Eventually, increase the speed of your walking for 1-2 minutes at time    In addition, we recommend that you review healthy lifestyles and methods for weight loss available through the National Institutes of Health patient information sites:  http://win.niddk.nih.gov/publications/index.htm    And look into health and wellness programs that may be available through your health insurance provider, employer, local community center, or [employer].

## 2024-06-25 NOTE — PROGRESS NOTES
Virtual Visit Details    Type of service:  Video Visit     Originating Location (pt. Location): Home    Distant Location (provider location):  On-site  Platform used for Video Visit: [facility]    Outpatient Sleep Medicine Consultation:      Name: Ap Johnson MRN# [id number]   Age: 48 year old YOB: 1975     Date of Consultation: June 25, 2024  Consultation is requested by: Harrison Huffman MD  [address] [place] [id number] Harrison Huffman  Primary care provider: Harrison Huffman       Reason for Sleep Consult:     Ap Johnson is sent by Harrison Huffman for a sleep consultation regarding snoring.    Patient s Reason for visit  Ap Johnson main reason for visit: Sleep Apnea spells if I am on my back  Patient states problem(s) started: 3+ years  Ap Johnson's goals for this visit: Stop apnea           Assessment and Plan:     Summary Sleep Diagnoses:  Patient has features and risk factors for possible obstructive sleep apnea including: loud snoring, witnessed apnea, non-refreshing sleep, bruxism, daytime sleepiness (ESS 14) and co-morbid HTN. The STOP-BANG score is 5/8. The pathophysiology, diagnosis and treatment of CURRY was discussed.   Plan:    1. Polysomnogram (using 4% desaturation/Medicare/ AASM 1B scoring rules) to evaluate for obstructive sleep apnea.      2. He is interested in a mandibular advancement device for mild sleep apnea and CPAP for moderate to severe sleep apnea.    3. Recommend weight management.      Summary Recommendations:  Orders Placed This Encounter   Procedures    Comprehensive Sleep Study     Summary Counseling:    Sleep Testing Reviewed  Obstructive Sleep Apnea Reviewed  Complications of Untreated Sleep Apnea Reviewed    Medical Decision-making:   Educational materials provided in instructions    Total time spent reviewing medical records, history and physical examination, review of previous testing and interpretation as  well as documentation on this date:45 minutes    CC: Harrison Huffman          History of Present Illness:     Past Sleep Evaluations: NA    SLEEP-WAKE SCHEDULE:     Work/School Days: Patient goes to school/work: Yes   Usually gets into bed at   Takes patient about 5-10 minute to fall asleep  Has trouble falling asleep N maria nights per week  Wakes up in the middle of the night Unsure times.  Wakes up due to Snorting self awake  He has trouble falling back asleep Never times a week.   It usually takes Less than 5 minutes to get back to sleep  Patient is usually up at 530-630  Uses alarm: Yes    Weekends/Non-work Days/All Other Days:  Usually gets into bed at    Takes patient about Less than 5 minutes to fall asleep  Patient is usually up at 530-630  Uses alarm: Yes    Sleep Need  Patient gets  6-8 hours sleep on average   Patient thinks he needs about 8 hours sleep    Ap Herzogarez Alex prefers to sleep in this position(s): Side   Patient states they do the following activities in bed: Read;Use phone, computer, or tablet    Naps  Patient takes a purposeful nap Spmetimes on the weekend times a week and naps are usually 1-2 hours in duration  He feels better after a nap: No  He dozes off unintentionally 2-3 tome a week days per week  Patient has had a driving accident or near-miss due to sleepiness/drowsiness: No      SLEEP DISRUPTIONS:    Breathing/Snoring  Patient snores:Yes  Other people complain about his snoring: Yes  Patient has been told he stops breathing in his sleep:Yes  He has issues with the following: Morning headaches (1-2/week)    Movement:  Patient gets pain, discomfort, with an urge to move:  No  It happens when he is resting:  No  It happens more at night:  No  Patient has been told he kicks his legs at night:  No     Behaviors in Sleep:  Ap Johnson has experienced the following behaviors while sleeping: Teeth grinding  He has experienced sudden muscle weakness during the  day: No      Is there anything else you would like your sleep provider to know:      CAFFEINE AND OTHER SUBSTANCES:    Patient consumes caffeinated beverages per day:  2-4  Last caffeine use is usually: 1-2 pm  List of any prescribed or over the counter stimulants that patient takes:    List of any prescribed or over the counter sleep medication patient takes: None  List of previous sleep medications that patient has tried:    Patient drinks alcohol to help them sleep: No  Patient drinks alcohol near bedtime: No    Family History:  Patient has a family member been diagnosed with a sleep disorder: Yes  Sleep apnea (brother)        SCALES:    EPWORTH SLEEPINESS SCALE         6/25/2024     6:19 AM    [place] Sleepiness Scale ( DOROTA Smith  [id number]<br>ESS - [place]/English - Final version - 21 Nov 07 - [facility] [employer].)   Sitting and reading High chance of dozing   Watching TV High chance of dozing   Sitting, inactive in a public place (e.g. a theatre or a meeting) Slight chance of dozing   As a passenger in a car for an hour without a break Slight chance of dozing   Lying down to rest in the afternoon when circumstances permit Moderate chance of dozing   Sitting and talking to someone Slight chance of dozing   Sitting quietly after a lunch without alcohol Moderate chance of dozing   In a car, while stopped for a few minutes in traffic Slight chance of dozing   [place] Score (MC) 14   [place] Score (Sleep) 14         INSOMNIA SEVERITY INDEX (EDUARD)          6/25/2024     6:08 AM   Insomnia Severity Index (EDUARD)   Difficulty falling asleep 0   Difficulty staying asleep 1   Problems waking up too early 1   How SATISFIED/DISSATISFIED are you with your CURRENT sleep pattern? 2   How NOTICEABLE to others do you think your sleep problem is in terms of impairing the quality of your life? 0   How WORRIED/DISTRESSED are you about your current sleep problem? 1   To what extent do you consider your sleep problem to INTERFERE  "with your daily functioning (e.g. daytime fatigue, mood, ability to function at work/daily chores, concentration, memory, mood, etc.) CURRENTLY? 2   EDUARD Total Score 7       Guidelines for Scoring/Interpretation:  Total score categories:  0-7 = No clinically significant insomnia   8-14 = Subthreshold insomnia   15-21 = Clinical insomnia (moderate severity)  22-28 = Clinical insomnia (severe)  Used via courtesy of www.MedSave USAealth.va.gov with permission from Adria Millard PhD., [facility]      STOP BANG         6/25/2024     7:44 AM   STOP BANG Questionnaire (  2008, the American Society of Anesthesiologists, Inc. Jaylyn [employer].)   BMI Clinic: 28.7         GAD7         No data to display                  CAGE-AID         No data to display                CAGE-AID reprinted with permission from the [place] Medical Journal, KHANG Tabares and ADAM Donald, \"Conjoint screening questionnaires for alcohol and drug abuse\" [place] Medical Journal 94: 135-140, 1995.      PATIENT HEALTH QUESTIONNAIRE-9 (PHQ - 9)         No data to display                Developed by Nolan Gomez, Tatiana Plascencia, Pato Tam and colleagues, with an educational jaydon from [employer] No permission required to reproduce, translate, display or distribute.        Allergies:    No Known Allergies    Medications:    Current Outpatient Medications   Medication Sig Dispense Refill    ketoconazole (NIZORAL) 200 MG tablet Take 1 po; wait 2 hours; then exercise to the point of sweating.  Repeat once if necessary 2 weeks later. 2 tablet 1    losartan (COZAAR) 25 MG tablet Take 1 tablet (25 mg) by mouth daily 60 tablet 4    losartan (COZAAR) 25 MG tablet Take 1 tablet (25 mg) by mouth daily 2 tablet 0       Problem List:  Patient Active Problem List    Diagnosis Date Noted    Essential hypertension 04/14/2024     Priority: Medium    Chronic pain of right knee 03/29/2021     Priority: Medium    Family history of " diabetes mellitus 03/29/2021     Priority: Medium    Hydradenitis 09/03/2015     Priority: Medium        Past Medical/Surgical History:  Past Medical History:   Diagnosis Date    Wellness examination 12/07/2012     No past surgical history on file.    Social History:  Social History     Socioeconomic History    Marital status:      Spouse name: Not on file    Number of children: Not on file    Years of education: Not on file    Highest education level: Not on file   Occupational History    Occupation: Patient Care Manager   Tobacco Use    Smoking status: Never    Smokeless tobacco: Never   Substance and Sexual Activity    Alcohol use: Yes     Alcohol/week: 2.0 - 3.0 standard drinks of alcohol     Types: 2 - 3 Standard drinks or equivalent per week     Comment: 2x per week    Drug use: No    Sexual activity: Yes     Partners: Female   Other Topics Concern    Parent/sibling w/ CABG, MI or angioplasty before 65F 55M? Not Asked   Social History Narrative    Not on file     Social Determinants of Health     Financial Resource Strain: Low Risk  (4/2/2024)    Financial Resource Strain     Within the past 12 months, have you or your family members you live with been unable to get utilities (heat, electricity) when it was really needed?: No   Food Insecurity: Low Risk  (4/2/2024)    Food Insecurity     Within the past 12 months, did you worry that your food would run out before you got money to buy more?: No     Within the past 12 months, did the food you bought just not last and you didn t have money to get more?: No   Transportation Needs: Low Risk  (4/2/2024)    Transportation Needs     Within the past 12 months, has lack of transportation kept you from medical appointments, getting your medicines, non-medical meetings or appointments, work, or from getting things that you need?: No   Physical Activity: Sufficiently Active (4/2/2024)    Exercise Vital Sign     Days of Exercise per Week: 4 days     Minutes of  Exercise per Session: 40 min   Stress: No Stress Concern Present (4/2/2024)    [place] [employer] - Occupational Stress Questionnaire     Feeling of Stress : Only a little   Social Connections: Unknown (4/2/2024)    Social Connection and Isolation Panel [NHANES]     Frequency of Communication with Friends and Family: Not on file     Frequency of Social Gatherings with Friends and Family: Once a week     Attends Mor[employer] Services: Not on file     Active Member of Clubs or Organizations: Not on file     Attends Club or Organization Meetings: Not on file     Marital Status: Not on file   Interpersonal Safety: Low Risk  (4/2/2024)    Interpersonal Safety     Do you feel physically and emotionally safe where you currently live?: Yes     Within the past 12 months, have you been hit, slapped, kicked or otherwise physically hurt by someone?: No     Within the past 12 months, have you been humiliated or emotionally abused in other ways by your partner or ex-partner?: No   Housing Stability: Low Risk  (4/2/2024)    Housing Stability     Do you have housing? : Yes     Are you worried about losing your housing?: No       Family History:  Family History   Problem Relation Age of Onset    Myocardial Infarction Paternal Grandfather        Review of Systems:  A complete review of systems reviewed by me is negative with the exeption of what has been mentioned in the history of present illness.  In the last TWO WEEKS have you experienced any of the following symptoms?  Fevers: No  Night Sweats: No  Weight Gain: No  Pain at Night: No  Double Vision: No  Changes in Vision: No  Difficulty Breathing through Nose: No  Sore Throat in Morning: No  Dry Mouth in the Morning: Yes  Shortness of Breath Lying Flat: No  Shortness of Breath With Activity: No  Awakening with Shortness of Breath: No  Increased Cough: No  Heart Racing at Night: No  Swelling in Feet or Legs: No  Diarrhea at Night: No  Heartburn at Night:  "No  Urinating More than Once at Night: No  Losing Control of Urine at Night: No  Joint Pains at Night: No  Headaches in Morning: Yes  Weakness in Arms or Legs: Yes  Depressed Mood: Yes  Anxiety: No     Physical Examination:  Vitals: Ht 1.778 m (5' 10\")   Wt 90.7 kg (200 lb)   BMI 28.70 kg/m    BMI= Body mass index is 28.7 kg/m .         GENERAL APPEARANCE: alert and no distress  EYES: Eyes grossly normal to inspection  NECK: no asymmetry, masses, or scars  RESP: breathing is non-labored   NEURO: mentation intact and speech normal  PSYCH: affect normal/bright  Mallampati Class:   Tonsillar Stage:          Data: All pertinent previous laboratory data reviewed     Recent Labs   Lab Test 04/02/24  1714 09/16/22  0839    140   POTASSIUM 4.2 3.8   CHLORIDE 107 105   CO2 26 24   ANIONGAP 10 11   GLC 85 98   BUN 17.1 15.3   CR 1.20* 0.96   MELISSA 9.5 9.2         LUZ Gil 6/25/2024           "

## 2024-09-03 PROBLEM — G89.29 CHRONIC PAIN OF RIGHT KNEE: Status: RESOLVED | Noted: 2021-03-29 | Resolved: 2024-09-03

## 2024-09-03 PROBLEM — M25.561 CHRONIC PAIN OF RIGHT KNEE: Status: RESOLVED | Noted: 2021-03-29 | Resolved: 2024-09-03

## 2024-09-03 NOTE — PROGRESS NOTES
Discharge Note    Progress reporting period is from initial evaluation date (please see noted date below) to Jun 14, 2024.  Linked Episodes   Type: Episode: Status: Noted: Resolved: Last update: Updated by:   PHYSICAL THERAPY right knee pain 5/13/2024 Active 5/13/2024 6/14/2024  3:29 PM Paulina Zhang, PT      Comments:       Ap has not returned for physical therapy follow up and current status is unknown.  Please see information below for last relevant information on current status.  Patient seen for   visits.    SUBJECTIVE  Subjective changes noted by patient:     .  Current pain level is  .     Previous pain level was   .   Changes in function:  Yes (See Goal flowsheet attached for changes in current functional level)  Adverse reaction to treatment or activity: None    OBJECTIVE  Changes noted in objective findings:       ASSESSMENT/PLAN      Updated problem list and treatment plan:   Pain - HEP  Decreased ROM/flexibility - HEP  Decreased function - HEP  Decreased strength - HEP  STG/LTGs have been met or progress has been made towards goals:  Yes, please see goal flowsheet for most current information  Assessment of Progress: current status is unknown.    Last current status:     Self Management Plans:  HEP  I have re-evaluated this patient and find that the nature, scope, duration and intensity of the therapy is appropriate for the medical condition of the patient.  Yahir continues to require the following intervention to meet STG and LTG's:  HEP.    Recommendations:  Discharge with current home program.  Patient to follow up with MD as needed.    Please refer to the daily flowsheet for treatment today, total treatment time and time spent performing 1:1 timed codes.

## 2024-09-30 ENCOUNTER — THERAPY VISIT (OUTPATIENT)
Dept: SLEEP MEDICINE | Facility: CLINIC | Age: 49
End: 2024-09-30
Attending: PHYSICIAN ASSISTANT
Payer: COMMERCIAL

## 2024-09-30 DIAGNOSIS — R06.00 DYSPNEA AND RESPIRATORY ABNORMALITY: ICD-10-CM

## 2024-09-30 DIAGNOSIS — R06.83 SNORING: ICD-10-CM

## 2024-09-30 DIAGNOSIS — Z72.820 LACK OF ADEQUATE SLEEP: ICD-10-CM

## 2024-09-30 DIAGNOSIS — R53.83 MALAISE AND FATIGUE: ICD-10-CM

## 2024-09-30 DIAGNOSIS — G47.33 OSA (OBSTRUCTIVE SLEEP APNEA): Primary | ICD-10-CM

## 2024-09-30 DIAGNOSIS — R06.89 DYSPNEA AND RESPIRATORY ABNORMALITY: ICD-10-CM

## 2024-09-30 DIAGNOSIS — R53.81 MALAISE AND FATIGUE: ICD-10-CM

## 2024-09-30 DIAGNOSIS — I10 ESSENTIAL HYPERTENSION: ICD-10-CM

## 2024-09-30 DIAGNOSIS — G47.30 SLEEP APNEA, UNSPECIFIED TYPE: ICD-10-CM

## 2024-09-30 PROCEDURE — 95810 POLYSOM 6/> YRS 4/> PARAM: CPT | Performed by: INTERNAL MEDICINE

## 2024-09-30 ASSESSMENT — SLEEP AND FATIGUE QUESTIONNAIRES
HOW LIKELY ARE YOU TO NOD OFF OR FALL ASLEEP WHILE LYING DOWN TO REST IN THE AFTERNOON WHEN CIRCUMSTANCES PERMIT: HIGH CHANCE OF DOZING
HOW LIKELY ARE YOU TO NOD OFF OR FALL ASLEEP WHEN YOU ARE A PASSENGER IN A CAR FOR AN HOUR WITHOUT A BREAK: MODERATE CHANCE OF DOZING
HOW LIKELY ARE YOU TO NOD OFF OR FALL ASLEEP WHILE SITTING QUIETLY AFTER LUNCH WITHOUT ALCOHOL: SLIGHT CHANCE OF DOZING
HOW LIKELY ARE YOU TO NOD OFF OR FALL ASLEEP IN A CAR, WHILE STOPPED FOR A FEW MINUTES IN TRAFFIC: WOULD NEVER DOZE
HOW LIKELY ARE YOU TO NOD OFF OR FALL ASLEEP WHILE SITTING INACTIVE IN A PUBLIC PLACE: SLIGHT CHANCE OF DOZING
HOW LIKELY ARE YOU TO NOD OFF OR FALL ASLEEP WHILE WATCHING TV: MODERATE CHANCE OF DOZING
HOW LIKELY ARE YOU TO NOD OFF OR FALL ASLEEP WHILE SITTING AND READING: SLIGHT CHANCE OF DOZING
HOW LIKELY ARE YOU TO NOD OFF OR FALL ASLEEP WHILE SITTING AND TALKING TO SOMEONE: WOULD NEVER DOZE

## 2024-10-03 PROBLEM — G47.33 OSA (OBSTRUCTIVE SLEEP APNEA): Chronic | Status: ACTIVE | Noted: 2024-10-03

## 2024-10-03 NOTE — PROCEDURES
" SLEEP STUDY INTERPRETATION  DIAGNOSTIC POLYSOMNOGRAPHY REPORT      Patient: JEFFRY TORRES  YOB: 1975  Study Date: 9/30/2024  MRN: [id number]  Referring Provider: MD Huffman Jon  Ordering Provider: LUZ Mcintosh Abass    Indications for Polysomnography: The patient is a 49 year old Male who is 5' 10\" and weighs 200.0 lbs. His BMI is 29.0, [place] sleepiness scale 14 and neck circumference is 42 cm. A diagnostic polysomnogram was performed to evaluate for loud snoring, witnessed apnea, non-refreshing sleep, bruxism, daytime sleepiness (ESS 14) and co-morbid HTN    Polysomnogram Data: A full night polysomnogram recorded the standard physiologic parameters including EEG, EOG, EMG, ECG, nasal and oral airflow. Respiratory parameters of chest and abdominal movements were recorded with respiratory inductance plethysmography. Oxygen saturation was recorded by pulse oximetry. Hypopnea scoring rule used: 1B 4%.    Sleep Architecture:    The total recording time of the polysomnogram was 435.5 minutes. The total sleep time was 402.0 minutes. Sleep latency was decreased at 7.5 minutes without the use of a sleep aid. REM latency was 101.0 minutes. Arousal index was increased at 39.9 arousals per hour. Sleep efficiency was normal at 92.3%. Wake after sleep onset was 26.0 minutes. The patient spent 6.1% of total sleep time in Stage N1, 72.9% in Stage N2, 0.0% in Stage N3, and 21.0% in REM. Time in REM supine was 34.0 minutes.    Respiration:    Events ? The polysomnogram revealed a presence of 8 obstructive, 0 central, and 4 mixed apneas resulting in an apnea index of 1.8 events per hour. There were 76 obstructive hypopneas and 0 central hypopneas resulting in an obstructive hypopnea index of 11.3 and central hypopnea index of 0 events per hour. The combined apnea/hypopnea index was 13.1 events per hour (central apnea/hypopnea index was 0 events per hour). The REM AHI was 23.4 events per hour. The supine AHI " was 26.0, non-supine AHI 6.0 events per hour. The RERA index was 15.8 events per hour.  The RDI was 29.0 events per hour.  Snoring - was reported as mild to moderate.  Respiratory rate and pattern - was notable for normal respiratory rate and pattern.  Sustained Sleep Associated Hypoventilation - Transcutaneous carbon dioxide monitoring was not used, however significant hypoventilation was not suggested by oximetry   Sleep Associated Hypoxemia - (Greater than 5 minutes O2 sat at or below 88%) was present. Baseline oxygen saturation was 93.8%. Lowest oxygen saturation was 84.0%. Time spent less than or equal to 88% was 6.0 minutes. Time spent less than or equal to 89% was 10.4 minutes.    Movement Activity:    Periodic Limb Activity - There were 64 PLMs during the entire study. The PLM index was 9.6 movements per hour. The PLM Arousal Index was 1.2 per hour.  REM EMG Activity - Excessive transient/sustained muscle activity was not present.  Nocturnal Behavior - Abnormal sleep related behaviors were not noted   Bruxism - None apparent.        Cardiac Summary:   The average pulse rate was 71.2 bpm. The minimum pulse rate was 55.0 bpm while the maximum pulse rate was 99.0 bpm.  Arrhythmias were not noted.      Assessment:   Mild obstructive sleep apnea with hypoxemia    Recommendations:  Based on the presence of mild/moderate obstructive sleep apnea and symptoms or comorbidities, treatment could be empirically initiated with Auto?titrating PAP therapy with a range of 5 to 15 cmH2O. Recommend clinical follow up with sleep management team.  Patient may be a candidate for dental appliance through referral to Sleep Dentistry for the treatment of obstructive sleep apnea and/or socially disruptive snoring.  If devices are not acceptable or effective, patient may benefit from evaluation of possible surgical options. If he is interested, would recommend referral to specialized ENT-Sleep provider.  Advice regarding the risks of  drowsy driving.  Suggest optimizing sleep schedule and avoiding sleep deprivation.  Weight management (if BMI > 30).  Pharmacologic therapy should be used for management of restless legs syndrome only if present and clinically indicated and not based on the presence of periodic limb movements alone.    Diagnostic Codes:   Obstructive Sleep Apnea G47.33  Sleep Hypoxemia/Hypoventilation G47.36            _____________________________________   Electronically Signed By: Elie Cervantes MD 10/3/24           Range(%) Time in range (min)   0.0 - 89.0 10.4   0.0 - 88.0 6.0         Stage Min(mm Hg) Max(mm Hg)   Wake - -   NREM(1+2+3) - -   REM - -       Range(mmHg) Time in range (min)   - -   - -

## 2024-10-04 LAB — SLPCOMP: NORMAL

## 2024-10-06 ENCOUNTER — MYC MEDICAL ADVICE (OUTPATIENT)
Dept: SLEEP MEDICINE | Facility: CLINIC | Age: 49
End: 2024-10-06

## 2024-10-06 DIAGNOSIS — G47.33 OSA (OBSTRUCTIVE SLEEP APNEA): Primary | Chronic | ICD-10-CM

## 2024-10-17 ENCOUNTER — MYC MEDICAL ADVICE (OUTPATIENT)
Dept: SLEEP MEDICINE | Facility: CLINIC | Age: 49
End: 2024-10-17

## 2024-10-31 ENCOUNTER — MYC MEDICAL ADVICE (OUTPATIENT)
Dept: SLEEP MEDICINE | Facility: CLINIC | Age: 49
End: 2024-10-31

## 2024-10-31 DIAGNOSIS — G47.33 OSA (OBSTRUCTIVE SLEEP APNEA): Primary | Chronic | ICD-10-CM

## 2024-12-04 ENCOUNTER — TELEPHONE (OUTPATIENT)
Dept: SLEEP MEDICINE | Facility: CLINIC | Age: 49
End: 2024-12-04
Payer: COMMERCIAL

## 2024-12-04 DIAGNOSIS — G47.33 OSA (OBSTRUCTIVE SLEEP APNEA): Primary | Chronic | ICD-10-CM

## 2024-12-04 NOTE — TELEPHONE ENCOUNTER
Received a request for signed dental device orders for [facility]. Provider reviewed and signed order. Order faxed back to [facility] at [phone]. Order scanned into Media and attached to this message.  Gretta Quintero CMA

## 2024-12-10 DIAGNOSIS — I10 ESSENTIAL HYPERTENSION: ICD-10-CM

## 2024-12-11 RX ORDER — LOSARTAN POTASSIUM 25 MG/1
25 TABLET ORAL DAILY
Qty: 90 TABLET | Refills: 0 | Status: SHIPPED | OUTPATIENT
Start: 2024-12-11

## 2024-12-11 NOTE — TELEPHONE ENCOUNTER
Medication requested: losartan (COZAAR) 25 MG tablet   Last office visit: 4/2/24  [facility] appointments: none  Medication last refilled:   Last qualifying labs:   BP Readings from Last 3 Encounters:   04/02/24 (!) 150/113   09/16/22 118/80   05/27/22 (!) 135/93     Component      Latest Ref Rng 4/2/2024  5:14 PM   Potassium      3.4 - 5.3 mmol/L 4.2      Component      Latest Ref Rng 4/2/2024  5:14 PM   GFR Estimate      >60 mL/min/1.73m2 76      Medication is being filled for 1 time refill only due to:  Patient needs to be seen because due for BP follow-up.    Sent message to pt to schedule appt with Dr. uHffman for BP follow-up.    Huseyin MITCHELL, RN  12/11/24 11:59 AM

## 2025-01-06 ENCOUNTER — OFFICE VISIT (OUTPATIENT)
Dept: FAMILY MEDICINE | Facility: CLINIC | Age: 50
End: 2025-01-06
Payer: COMMERCIAL

## 2025-01-06 ENCOUNTER — MYC MEDICAL ADVICE (OUTPATIENT)
Dept: FAMILY MEDICINE | Facility: CLINIC | Age: 50
End: 2025-01-06

## 2025-01-06 VITALS
WEIGHT: 210 LBS | RESPIRATION RATE: 16 BRPM | OXYGEN SATURATION: 96 % | BODY MASS INDEX: 30.13 KG/M2 | HEART RATE: 75 BPM | SYSTOLIC BLOOD PRESSURE: 136 MMHG | DIASTOLIC BLOOD PRESSURE: 88 MMHG | TEMPERATURE: 98.2 F

## 2025-01-06 DIAGNOSIS — B36.0 TINEA VERSICOLOR: ICD-10-CM

## 2025-01-06 DIAGNOSIS — I10 ESSENTIAL HYPERTENSION: ICD-10-CM

## 2025-01-06 DIAGNOSIS — R07.89 ATYPICAL CHEST PAIN: Primary | ICD-10-CM

## 2025-01-06 DIAGNOSIS — Z23 HIGH PRIORITY FOR 2019-NCOV VACCINE: ICD-10-CM

## 2025-01-06 RX ORDER — KETOCONAZOLE 200 MG/1
TABLET ORAL
Qty: 10 TABLET | Refills: 1 | Status: SHIPPED | OUTPATIENT
Start: 2025-01-06 | End: 2025-01-07

## 2025-01-06 RX ORDER — LOSARTAN POTASSIUM 25 MG/1
25 TABLET ORAL DAILY
Qty: 90 TABLET | Refills: 3 | Status: CANCELLED | OUTPATIENT
Start: 2025-01-06

## 2025-01-06 NOTE — PATIENT INSTRUCTIONS
Take losartan 25 mg, two pills together once daily.  Check BP.  If this gets us <140/90, then we'll go with a 50 mg pill.

## 2025-01-06 NOTE — NURSING NOTE
"Ap  49 year old    Chief Complaint   Patient presents with    Hypertension     Follow up - taking losartan 25 mg, was monitoring when starting medication and seems to be \"a lot better\"     Chest Pain     Possible heart burn/chest pain that last for about 10 min at the epigastric area, occurs randomly, has had these symptoms in the past    Imm/Inj     COVID-19 VACCINE            Blood pressure 136/88, pulse 75, temperature 98.2  F (36.8  C), temperature source Skin, resp. rate 16, weight 95.3 kg (210 lb), SpO2 96%. Body mass index is 30.13 kg/m .    Patient Active Problem List   Diagnosis    Hydradenitis    Family history of diabetes mellitus    Essential hypertension    CURRY (obstructive sleep apnea) - mild (AHI 13)              Wt Readings from Last 2 Encounters:   01/06/25 95.3 kg (210 lb)   06/25/24 90.7 kg (200 lb)       BP Readings from Last 3 Encounters:   01/06/25 136/88   04/02/24 (!) 150/113   09/16/22 118/80                Current Outpatient Medications   Medication Sig Dispense Refill    ketoconazole (NIZORAL) 200 MG tablet Take 1 po; wait 2 hours; then exercise to the point of sweating.  Repeat once if necessary 2 weeks later. 2 tablet 1    losartan (COZAAR) 25 MG tablet Take 1 tablet (25 mg) by mouth daily. 90 tablet 0     No current facility-administered medications for this visit.              Social History     Tobacco Use    Smoking status: Never    Smokeless tobacco: Never   Substance Use Topics    Alcohol use: Yes     Alcohol/week: 2.0 - 3.0 standard drinks of alcohol     Types: 2 - 3 Standard drinks or equivalent per week     Comment: 2x per week    Drug use: No              Health Maintenance Due   Topic Date Due    ADVANCE CARE PLANNING  Never done    COVID-19 Vaccine (4 - 2024-25 season) 09/01/2024            No results found for: \"PAP\"           January 6, 2025 8:58 AM    "
RUQ pain

## 2025-01-07 RX ORDER — KETOCONAZOLE 200 MG/1
TABLET ORAL
Qty: 10 TABLET | Refills: 1 | Status: SHIPPED | OUTPATIENT
Start: 2025-01-07

## 2025-01-12 NOTE — PROGRESS NOTES
"Chief Complaint: Follow-up regarding high blood pressure, chest pain, and immunization request    HPI: Ap is a 49-year-old who is now working at [employer].  He enjoys his job.    He has been dealing with some hypertension and, after checking his blood pressure at home, we decided to go ahead and start him on losartan 25 mg once daily.  He has been doing that and is tolerating the medication well.  No side effects.  Specifically, no dry cough.  Blood pressure readings have been much better and he feels \"a lot better.\"  He acknowledges that it is hard to know exactly what that is but he simply feels better.    That being said, occasionally he will get some heartburn/chest pain that can last for about 10 minutes.  It is in the midepigastric area.  Occurs randomly.  This has occurred in the past.  He knows that he is at relatively low risk for heart disease.  No ominous family history.  Nevertheless, it does bother him and we will go ahead and check an EKG today.    He would like to get a COVID-vaccine today.    Active medical problems: Reviewed    Current medications: Reviewed    Objective:    Ap is in no distress.  Affect upbeat.  Breathing comfortably.    Vital signs are stable.  /88.  O2 sats are 96% on room air.  BMI 30.13.    Auscultation of the heart reveals a regular rate and rhythm without murmur.  No reproducible pain with palpation of the chest wall.  His lungs are clear.    Skin: Very fine, somewhat indistinct \"salmon\" colored rash around the upper chest and neck.  He has a history in the past with pityriasis rosea or something similar.  He has done well with ketoconazole.  Will go ahead and prescribe that again.    Laboratory studies: None      Assessment/Plan:    Ap is a 49-year-old with a history of essential hypertension, now under much better control on losartan 25 mg once daily.  Will continue with that for now.  He can work on activity and some weight loss.  " It would be great to see his BMI less than 30.  We can go up to 50 mg if/when necessary.    EKG obtained today and this looks normal.  Reassurance will be shared with him.    COVID-19 booster given today.    I look forward to seeing Ap back for his annual wellness visit.  He will reach out the meantime with any questions or concerns.        The longitudinal plan of care for the diagnosis(es)/condition(s) as documented were addressed during this visit. Due to the added complexity in care, I will continue to support Ap in the subsequent management and with ongoing continuity of care.

## 2025-02-11 ENCOUNTER — VIRTUAL VISIT (OUTPATIENT)
Dept: SLEEP MEDICINE | Facility: CLINIC | Age: 50
End: 2025-02-11
Payer: COMMERCIAL

## 2025-02-11 VITALS — WEIGHT: 205 LBS | BODY MASS INDEX: 29.35 KG/M2 | HEIGHT: 70 IN

## 2025-02-11 DIAGNOSIS — G47.33 OSA (OBSTRUCTIVE SLEEP APNEA): Primary | ICD-10-CM

## 2025-02-11 PROCEDURE — 98006 SYNCH AUDIO-VIDEO EST MOD 30: CPT | Performed by: PHYSICIAN ASSISTANT

## 2025-02-11 ASSESSMENT — SLEEP AND FATIGUE QUESTIONNAIRES
HOW LIKELY ARE YOU TO NOD OFF OR FALL ASLEEP WHILE WATCHING TV: SLIGHT CHANCE OF DOZING
HOW LIKELY ARE YOU TO NOD OFF OR FALL ASLEEP WHILE SITTING AND TALKING TO SOMEONE: WOULD NEVER DOZE
HOW LIKELY ARE YOU TO NOD OFF OR FALL ASLEEP IN A CAR, WHILE STOPPED FOR A FEW MINUTES IN TRAFFIC: WOULD NEVER DOZE
HOW LIKELY ARE YOU TO NOD OFF OR FALL ASLEEP WHILE SITTING INACTIVE IN A PUBLIC PLACE: SLIGHT CHANCE OF DOZING
HOW LIKELY ARE YOU TO NOD OFF OR FALL ASLEEP WHILE SITTING AND READING: SLIGHT CHANCE OF DOZING
HOW LIKELY ARE YOU TO NOD OFF OR FALL ASLEEP WHILE LYING DOWN TO REST IN THE AFTERNOON WHEN CIRCUMSTANCES PERMIT: MODERATE CHANCE OF DOZING
HOW LIKELY ARE YOU TO NOD OFF OR FALL ASLEEP WHEN YOU ARE A PASSENGER IN A CAR FOR AN HOUR WITHOUT A BREAK: SLIGHT CHANCE OF DOZING
HOW LIKELY ARE YOU TO NOD OFF OR FALL ASLEEP WHILE SITTING QUIETLY AFTER LUNCH WITHOUT ALCOHOL: WOULD NEVER DOZE

## 2025-02-11 ASSESSMENT — PAIN SCALES - GENERAL: PAINLEVEL_OUTOF10: NO PAIN (0)

## 2025-02-11 NOTE — NURSING NOTE
Current patient location: [address] [place] [address]    Is the patient currently in the state of [place]? YES    Visit mode: VIDEO    If the visit is dropped, the patient can be reconnected by:VIDEO VISIT: Text to cell phone:   Telephone Information:   Mobile [phone]       Will anyone else be joining the visit? NO  (If patient encounters technical issues they should call [phone]    Are changes needed to the allergy or medication list? Pt stated no changes to allergies and Pt stated no med changes    Are refills needed on medications prescribed by this physician? NO    Rooming Documentation:  Questionnaire(s) completed    Reason for visit: NORMAN SHAHF

## 2025-02-11 NOTE — PATIENT INSTRUCTIONS
Your Body mass index is 29.41 kg/m .  Weight management is a personal decision.  If you are interested in exploring weight loss strategies, the following discussion covers the approaches that may be successful. Body mass index (BMI) is one way to tell whether you are at a healthy weight, overweight, or obese. It measures your weight in relation to your height.  A BMI of 18.5 to 24.9 is in the healthy range. A person with a BMI of 25 to 29.9 is considered overweight, and someone with a BMI of 30 or greater is considered obese. More than two-thirds of American adults are considered overweight or obese.  Being overweight or obese increases the risk for further weight gain. Excess weight may lead to heart disease and diabetes.  Creating and following plans for healthy eating and physical activity may help you improve your health.  Weight control is part of healthy lifestyle and includes exercise, emotional health, and healthy eating habits. Careful eating habits lifelong are the mainstay of weight control. Though there are significant health benefits from weight loss, long-term weight loss with diet alone may be very difficult to achieve- studies show long-term success with dietary management in less than 10% of people. Attaining a healthy weight may be especially difficult to achieve in those with severe obesity. In some cases, medications, devices and surgical management might be considered.  What can you do?  If you are overweight or obese and are interested in methods for weight loss, you should discuss this with your provider.   Consider reducing daily calorie intake by 500 calories.   Keep a food journal.   Avoiding skipping meals, consider cutting portions instead.    Diet combined with exercise helps maintain muscle while optimizing fat loss. Strength training is particularly important for building and maintaining muscle mass. Exercise helps reduce stress, increase energy, and improves fitness. Increasing  exercise without diet control, however, may not burn enough calories to loose weight.     Start walking three days a week 10-20 minutes at a time  Work towards walking thirty minutes five days a week   Eventually, increase the speed of your walking for 1-2 minutes at time    In addition, we recommend that you review healthy lifestyles and methods for weight loss available through the National Institutes of Health patient information sites:  http://win.niddk.nih.gov/publications/index.htm    And look into health and wellness programs that may be available through your health insurance provider, employer, local community center, or [employer].

## 2025-02-11 NOTE — PROGRESS NOTES
Virtual Visit Details    Type of service:  Video Visit     Originating Location (pt. Location): Home    Distant Location (provider location):  On-site  Platform used for Video Visit: [facility]    Sleep Apnea - Follow-up Visit:    Assessment:  The patient has a history of mild obstructive sleep apnea (CURRY) with associated sleep-related hypoxemia. The condition has been managed with a mandibular advancement device (MAD), which the patient reports has provided good benefits in terms of symptom relief.    Plan:  Assess Treatment Efficacy -  A home sleep apnea test (HSAT) was ordered to evaluate the effectiveness of the MAD in reducing apneic events and improving oxygenation during sleep. Consider alternative therapies if MAD is not sufficiently effective.  Symptom Monitoring -   He will continue to monitor for residual symptoms such as excessive daytime sleepiness, snoring, or nocturnal awakenings.    Ap Herzoghelga Webby will follow up as needed. HSAT results via SMS Assist.     Tiffanie Mcintosh PA-C  Sleep Medicine       History of Present Illness:  Chief Complaint   Patient presents with    RECHECK       Ap Johnson presents for follow-up of their mild sleep apnea, managed with CPAP.     He presented with loud snoring, witnessed apnea, non-refreshing sleep, bruxism, daytime sleepiness (ESS 14) and co-morbid HTN.    9/30/2024 [facility] Sleep Study (200.0 lbs) - AHI 13.1, RDI 29.0, Supine AHI 26.0, non-supine AHI 6.0. REM AHI 23.4, Low O2 84.0%, Time Spent <=88% 6.0 minutes / Time Spent <=89% 10.4 minutes.    He elected treatment with  a mandibular advancement device.    Do you use a CPAP Machine at home: (Patient-Rptd) No  Overall, on a scale of 0-10 how would you rate your MAD (0 poor, 10 great):  Good    Do you notice snoring with MAD on:  yes, rare  Do you notice gasping arousals with MAD on:  no  Are you having significant oral or nasal dryness:    Is the MAD setting comfortable:  yes  If not, why:       What is your typical bedtime:  930-1030 pm  How long does it take you to go to sleep on MAD therapy:  5 minutes  What time do you typically get out of bed for the day:  6 am  How many hours on average per night are you using MAD therapy:  7-8  How many hours are you sleeping per night:  7-8  Do you feel well rested in the morning:  yes    He reports MAD is going well and also noted decrease in daytime fatigue      EPWORTH SLEEPINESS SCALE         2/11/2025     7:07 AM    Blackwell Sleepiness Scale ( DOROTA Smith  [id number]<br>ESS - [place]/English - Final version - 21 Nov 07 - [facility] [place].)   Sitting and reading Slight chance of dozing   Watching TV Slight chance of dozing   Sitting, inactive in a public place (e.g. a theatre or a meeting) Slight chance of dozing   As a passenger in a car for an hour without a break Slight chance of dozing   Lying down to rest in the afternoon when circumstances permit Moderate chance of dozing   Sitting and talking to someone Would never doze   Sitting quietly after a lunch without alcohol Would never doze   In a car, while stopped for a few minutes in traffic Would never doze   Blackwell Score (MC) 6   Blackwell Score (Sleep) 6        Patient-reported       INSOMNIA SEVERITY INDEX (EDUARD)          2/11/2025     7:04 AM   Insomnia Severity Index (EDUARD)   Difficulty falling asleep 0   Difficulty staying asleep 1   Problems waking up too early 1   How SATISFIED/DISSATISFIED are you with your CURRENT sleep pattern? 1   How NOTICEABLE to others do you think your sleep problem is in terms of impairing the quality of your life? 0   How WORRIED/DISTRESSED are you about your current sleep problem? 0   To what extent do you consider your sleep problem to INTERFERE with your daily functioning (e.g. daytime fatigue, mood, ability to function at work/daily chores, concentration, memory, mood, etc.) CURRENTLY? 0   EDUARD Total Score 3        Patient-reported       Guidelines for  "Scoring/Interpretation:  Total score categories:  0-7 = No clinically significant insomnia   8-14 = Subthreshold insomnia   15-21 = Clinical insomnia (moderate severity)  22-28 = Clinical insomnia (severe)  Used via courtesy of www.myhealth.va.gov with permission from Adria Millard PhD., [facility]        Past medical/surgical history, family history, social history, medications and allergies were reviewed.        Problem List:  Patient Active Problem List    Diagnosis Date Noted    CURRY (obstructive sleep apnea) - mild (AHI 13) 10/03/2024     Priority: Medium     9/30/2024 [facility] Sleep Study (200.0 lbs) - AHI 13.1, RDI 29.0, Supine AHI 26.0, non-supine AHI 6.0. REM AHI 23.4, Low O2 84.0%, Time Spent <=88% 6.0 minutes / Time Spent <=89% 10.4 minutes.      Essential hypertension 04/14/2024     Priority: Medium    Family history of diabetes mellitus 03/29/2021     Priority: Medium    Hydradenitis 09/03/2015     Priority: Medium        Ht 1.778 m (5' 10\")   Wt 93 kg (205 lb)   BMI 29.41 kg/m     "

## 2025-02-17 ENCOUNTER — MYC REFILL (OUTPATIENT)
Dept: FAMILY MEDICINE | Facility: CLINIC | Age: 50
End: 2025-02-17

## 2025-02-17 DIAGNOSIS — I10 ESSENTIAL HYPERTENSION: ICD-10-CM

## 2025-02-17 RX ORDER — LOSARTAN POTASSIUM 50 MG/1
50 TABLET ORAL DAILY
Qty: 90 TABLET | Refills: 1 | Status: SHIPPED | OUTPATIENT
Start: 2025-02-17

## 2025-02-17 NOTE — TELEPHONE ENCOUNTER
Medication requested: losartan (COZAAR) 25 MG tablet   Last office visit: 1/6/25  [facility] appointments: none  Medication last refilled: 12/11/24; 90 + 0 refills  Last qualifying labs:   BP Readings from Last 3 Encounters:   01/06/25 136/88   04/02/24 (!) 150/113   09/16/22 118/80     Prescription approved per [facility] Refill Protocol.    Huseyin MITCHELL, RN  02/17/25 3:44 PM

## 2025-05-04 ASSESSMENT — SLEEP AND FATIGUE QUESTIONNAIRES
HOW LIKELY ARE YOU TO NOD OFF OR FALL ASLEEP WHILE SITTING QUIETLY AFTER LUNCH WITHOUT ALCOHOL: WOULD NEVER DOZE
HOW LIKELY ARE YOU TO NOD OFF OR FALL ASLEEP WHILE WATCHING TV: SLIGHT CHANCE OF DOZING
HOW LIKELY ARE YOU TO NOD OFF OR FALL ASLEEP WHILE SITTING INACTIVE IN A PUBLIC PLACE: WOULD NEVER DOZE
HOW LIKELY ARE YOU TO NOD OFF OR FALL ASLEEP IN A CAR, WHILE STOPPED FOR A FEW MINUTES IN TRAFFIC: WOULD NEVER DOZE
HOW LIKELY ARE YOU TO NOD OFF OR FALL ASLEEP WHILE SITTING AND TALKING TO SOMEONE: WOULD NEVER DOZE
HOW LIKELY ARE YOU TO NOD OFF OR FALL ASLEEP WHILE SITTING AND READING: WOULD NEVER DOZE
HOW LIKELY ARE YOU TO NOD OFF OR FALL ASLEEP WHILE LYING DOWN TO REST IN THE AFTERNOON WHEN CIRCUMSTANCES PERMIT: SLIGHT CHANCE OF DOZING
HOW LIKELY ARE YOU TO NOD OFF OR FALL ASLEEP WHEN YOU ARE A PASSENGER IN A CAR FOR AN HOUR WITHOUT A BREAK: SLIGHT CHANCE OF DOZING

## 2025-05-08 ENCOUNTER — MYC MEDICAL ADVICE (OUTPATIENT)
Dept: FAMILY MEDICINE | Facility: CLINIC | Age: 50
End: 2025-05-08

## 2025-05-08 DIAGNOSIS — I10 ESSENTIAL HYPERTENSION: Primary | ICD-10-CM

## 2025-05-08 RX ORDER — LOSARTAN POTASSIUM 50 MG/1
50 TABLET ORAL DAILY
Qty: 3 TABLET | Refills: 0 | Status: SHIPPED | OUTPATIENT
Start: 2025-05-08 | End: 2025-05-11

## 2025-06-19 ENCOUNTER — OFFICE VISIT (OUTPATIENT)
Dept: FAMILY MEDICINE | Facility: CLINIC | Age: 50
End: 2025-06-19
Payer: COMMERCIAL

## 2025-06-19 VITALS
SYSTOLIC BLOOD PRESSURE: 124 MMHG | TEMPERATURE: 97.3 F | BODY MASS INDEX: 28.63 KG/M2 | HEART RATE: 71 BPM | OXYGEN SATURATION: 98 % | WEIGHT: 200 LBS | HEIGHT: 70 IN | DIASTOLIC BLOOD PRESSURE: 81 MMHG

## 2025-06-19 DIAGNOSIS — Z13.220 SCREENING FOR HYPERLIPIDEMIA: ICD-10-CM

## 2025-06-19 DIAGNOSIS — I10 ESSENTIAL HYPERTENSION: ICD-10-CM

## 2025-06-19 DIAGNOSIS — Z00.00 WELLNESS EXAMINATION: Primary | ICD-10-CM

## 2025-06-19 LAB
ANION GAP SERPL CALCULATED.3IONS-SCNC: 11 MMOL/L (ref 7–15)
BUN SERPL-MCNC: 16.8 MG/DL (ref 6–20)
CALCIUM SERPL-MCNC: 9.3 MG/DL (ref 8.8–10.4)
CHLORIDE SERPL-SCNC: 105 MMOL/L (ref 98–107)
CHOLEST SERPL-MCNC: 201 MG/DL
CREAT SERPL-MCNC: 1.01 MG/DL (ref 0.67–1.17)
EGFRCR SERPLBLD CKD-EPI 2021: >90 ML/MIN/1.73M2
FASTING STATUS PATIENT QL REPORTED: YES
FASTING STATUS PATIENT QL REPORTED: YES
GLUCOSE SERPL-MCNC: 104 MG/DL (ref 70–99)
HCO3 SERPL-SCNC: 24 MMOL/L (ref 22–29)
HDLC SERPL-MCNC: 37 MG/DL
LDLC SERPL CALC-MCNC: 125 MG/DL
NONHDLC SERPL-MCNC: 164 MG/DL
POTASSIUM SERPL-SCNC: 4.4 MMOL/L (ref 3.4–5.3)
SODIUM SERPL-SCNC: 140 MMOL/L (ref 135–145)
TRIGL SERPL-MCNC: 194 MG/DL

## 2025-06-19 SDOH — HEALTH STABILITY: PHYSICAL HEALTH: ON AVERAGE, HOW MANY DAYS PER WEEK DO YOU ENGAGE IN MODERATE TO STRENUOUS EXERCISE (LIKE A BRISK WALK)?: 5 DAYS

## 2025-06-19 SDOH — HEALTH STABILITY: PHYSICAL HEALTH: ON AVERAGE, HOW MANY MINUTES DO YOU ENGAGE IN EXERCISE AT THIS LEVEL?: 50 MIN

## 2025-06-19 ASSESSMENT — SOCIAL DETERMINANTS OF HEALTH (SDOH): HOW OFTEN DO YOU GET TOGETHER WITH FRIENDS OR RELATIVES?: ONCE A WEEK

## 2025-06-19 NOTE — PROGRESS NOTES
Preventive Care Visit  [facility]  Harrison Huffman MD, Family Medicine  Jun 19, 2025    Duke De Souza is a 49 year old, presenting for the following:  Physical    HPI    Ap is a 49-year-old here today for his annual wellness visit.  Overall, he is doing quite well.    He is lost about 10 pounds intentionally secondary to walking more.  He has a stress test pending due to the atypical chest pain that he was experiencing back in January.   He has not had any of that pain since.    He does not have any care gaps and is up-to-date with all recommended healthcare maintenance strategies.        6/19/2025   General Health   How would you rate your overall physical health? Good   Feel stress (tense, anxious, or unable to sleep) Only a little   (!) STRESS CONCERN      6/19/2025   Nutrition   Three or more servings of calcium each day? Yes   Diet: Regular (no restrictions)   How many servings of fruit and vegetables per day? (!) 2-3   How many sweetened beverages each day? (!) 2         6/19/2025   Exercise   Days per week of moderate/strenous exercise 5 days   Average minutes spent exercising at this level 50 min         6/19/2025   Social Factors   Frequency of gathering with friends or relatives Once a week   Worry food won't last until get money to buy more No   Food not last or not have enough money for food? No   Do you have housing? (Housing is defined as stable permanent housing and does not include staying outside in a car, in a tent, in an abandoned building, in an overnight shelter, or couch-surfing.) Yes   Are you worried about losing your housing? No   Lack of transportation? No   Unable to get utilities (heat,electricity)? No         6/19/2025   Dental   Dentist two times every year? Yes       Today's PHQ-2 Score:       2/11/2025     7:43 AM   PHQ-2 ( 1999 Pf[employer])   Q1: Little interest or pleasure in doing things 0   Q2: Feeling down, depressed or hopeless 0   PHQ-2 Score 0         6/19/2025  "  Substance Use   Alcohol more than 3/day or more than 7/wk No   Do you use any other substances recreationally? No     Social History     Tobacco Use    Smoking status: Never    Smokeless tobacco: Never   Substance Use Topics    Alcohol use: Yes     Alcohol/week: 2.0 - 3.0 standard drinks of alcohol     Types: 2 - 3 Standard drinks or equivalent per week     Comment: 2x per week    Drug use: No         6/19/2025   STI Screening   New sexual partner(s) since last STI/HIV test? No     ASCVD Risk   The 10-year ASCVD risk score (Naman GEORGE, et al., 2019) is: 4.9%    Values used to calculate the score:      Age: 49 years      Sex: Male      Is Non- : No      Diabetic: No      Tobacco smoker: No      Systolic Blood Pressure: 124 mmHg      Is BP treated: Yes      HDL Cholesterol: 35 mg/dL      Total Cholesterol: 221 mg/dL        6/19/2025   Contraception/Family Planning   Questions about contraception or family planning No     Review of Systems  Constitutional, neuro, ENT, endocrine, pulmonary, cardiac, gastrointestinal, genitourinary, musculoskeletal, integument and psychiatric systems are negative, except as otherwise noted.     Objective    Exam  /81   Pulse 71   Temp 97.3  F (36.3  C)   Ht 1.775 m (5' 9.88\")   Wt 90.7 kg (200 lb)   SpO2 98%   BMI 28.79 kg/m     Estimated body mass index is 28.79 kg/m  as calculated from the following:    Height as of this encounter: 1.775 m (5' 9.88\").    Weight as of this encounter: 90.7 kg (200 lb).    Physical Exam  GENERAL: alert and no distress  EYES: Eyes grossly normal to inspection, PERRL and conjunctivae and sclerae normal  HENT: ear canals and TM's normal, nose and mouth without ulcers or lesions  NECK: no adenopathy, no asymmetry, masses, or scars  RESP: lungs clear to auscultation - no rales, rhonchi or wheezes  CV: regular rate and rhythm, normal S1 S2, no S3 or S4, no murmur, click or rub, no peripheral edema  MS: no gross " musculoskeletal defects noted, no edema  SKIN: no suspicious lesions or rashes  NEURO: Normal strength and tone, mentation intact and speech normal  PSYCH: mentation appears normal, affect normal/bright    Laboratory studies: Basic metabolic panel and lipid panel, pending.      Assessment/Plan:    Ap is a 49-year-old here today for his annual wellness visit.  He is up-to-date with all recommended healthcare maintenance strategies.  Labs are pending.  He will notify the results.  He will follow through with the previously ordered exercise stress test based on the atypical chest pain that he had had previously.    I look forward to seeing him back in 1 year.  He will reach out the meantime with any questions or concerns.      Signed Electronically by: Harrison Huffman MD

## 2025-06-19 NOTE — NURSING NOTE
"Ap  49 year old    Chief Complaint   Patient presents with    Physical           Blood pressure 124/81, pulse 71, temperature 97.3  F (36.3  C), height 1.775 m (5' 9.88\"), weight 90.7 kg (200 lb), SpO2 98%. Body mass index is 28.79 kg/m .    Patient Active Problem List   Diagnosis    Hydradenitis    Family history of diabetes mellitus    Essential hypertension    CURRY (obstructive sleep apnea) - mild (AHI 13)             Wt Readings from Last 2 Encounters:   06/19/25 90.7 kg (200 lb)   02/11/25 93 kg (205 lb)       BP Readings from Last 3 Encounters:   06/19/25 124/81   01/06/25 136/88   04/02/24 (!) 150/113                Current Outpatient Medications   Medication Sig Dispense Refill    ketoconazole (NIZORAL) 200 MG tablet Take 1 po; wait 2 hours; then exercise to the point of sweating.  Repeat once if necessary 2 weeks later. 10 tablet 1    losartan (COZAAR) 50 MG tablet Take 1 tablet (50 mg) by mouth daily. 90 tablet 1    losartan (COZAAR) 50 MG tablet Take 1 tablet (50 mg) by mouth daily for 3 days. 3 tablet 0     No current facility-administered medications for this visit.             Social History     Tobacco Use    Smoking status: Never    Smokeless tobacco: Never   Substance Use Topics    Alcohol use: Yes     Alcohol/week: 2.0 - 3.0 standard drinks of alcohol     Types: 2 - 3 Standard drinks or equivalent per week     Comment: 2x per week    Drug use: No             Health Maintenance Due   Topic Date Due    ADVANCE CARE PLANNING  Never done    YEARLY PREVENTIVE VISIT  04/02/2025    BMP  04/02/2025           No results found for: \"PAP\"           June 19, 2025 8:19 AM    "

## 2025-08-13 ASSESSMENT — SLEEP AND FATIGUE QUESTIONNAIRES
HOW LIKELY ARE YOU TO NOD OFF OR FALL ASLEEP WHILE LYING DOWN TO REST IN THE AFTERNOON WHEN CIRCUMSTANCES PERMIT: SLIGHT CHANCE OF DOZING
HOW LIKELY ARE YOU TO NOD OFF OR FALL ASLEEP WHEN YOU ARE A PASSENGER IN A CAR FOR AN HOUR WITHOUT A BREAK: SLIGHT CHANCE OF DOZING
HOW LIKELY ARE YOU TO NOD OFF OR FALL ASLEEP WHILE SITTING QUIETLY AFTER LUNCH WITHOUT ALCOHOL: WOULD NEVER DOZE
HOW LIKELY ARE YOU TO NOD OFF OR FALL ASLEEP IN A CAR, WHILE STOPPED FOR A FEW MINUTES IN TRAFFIC: WOULD NEVER DOZE
HOW LIKELY ARE YOU TO NOD OFF OR FALL ASLEEP WHILE SITTING AND TALKING TO SOMEONE: WOULD NEVER DOZE
HOW LIKELY ARE YOU TO NOD OFF OR FALL ASLEEP WHILE SITTING AND READING: WOULD NEVER DOZE
HOW LIKELY ARE YOU TO NOD OFF OR FALL ASLEEP WHILE SITTING INACTIVE IN A PUBLIC PLACE: WOULD NEVER DOZE
HOW LIKELY ARE YOU TO NOD OFF OR FALL ASLEEP WHILE WATCHING TV: SLIGHT CHANCE OF DOZING

## 2025-08-14 ENCOUNTER — OFFICE VISIT (OUTPATIENT)
Dept: SLEEP MEDICINE | Facility: CLINIC | Age: 50
End: 2025-08-14
Attending: PHYSICIAN ASSISTANT
Payer: COMMERCIAL

## 2025-08-14 DIAGNOSIS — G47.33 OSA (OBSTRUCTIVE SLEEP APNEA): ICD-10-CM

## 2025-09-02 DIAGNOSIS — I10 ESSENTIAL HYPERTENSION: ICD-10-CM

## 2025-09-03 RX ORDER — LOSARTAN POTASSIUM 50 MG/1
50 TABLET ORAL DAILY
Qty: 90 TABLET | Refills: 2 | Status: SHIPPED | OUTPATIENT
Start: 2025-09-03